# Patient Record
Sex: MALE | Race: WHITE | NOT HISPANIC OR LATINO | Employment: OTHER | URBAN - METROPOLITAN AREA
[De-identification: names, ages, dates, MRNs, and addresses within clinical notes are randomized per-mention and may not be internally consistent; named-entity substitution may affect disease eponyms.]

---

## 2023-03-09 DIAGNOSIS — I10 PRIMARY HYPERTENSION: Primary | ICD-10-CM

## 2023-03-09 RX ORDER — AMLODIPINE BESYLATE 10 MG/1
10 TABLET ORAL DAILY
Qty: 30 TABLET | Refills: 0 | Status: SHIPPED | OUTPATIENT
Start: 2023-03-09 | End: 2023-03-21 | Stop reason: SDUPTHER

## 2023-03-09 RX ORDER — AMLODIPINE BESYLATE 10 MG/1
1 TABLET ORAL DAILY
COMMUNITY
Start: 2022-06-02 | End: 2023-03-09 | Stop reason: SDUPTHER

## 2023-03-09 RX ORDER — SILDENAFIL 100 MG/1
TABLET, FILM COATED ORAL
COMMUNITY
Start: 2022-07-07 | End: 2023-03-11 | Stop reason: ALTCHOICE

## 2023-03-09 RX ORDER — LOSARTAN POTASSIUM 100 MG/1
1 TABLET ORAL DAILY
COMMUNITY
Start: 2022-06-02 | End: 2023-03-09 | Stop reason: SDUPTHER

## 2023-03-09 RX ORDER — ATORVASTATIN CALCIUM 80 MG/1
1 TABLET, FILM COATED ORAL DAILY
COMMUNITY
Start: 2022-06-02 | End: 2023-06-21 | Stop reason: SDUPTHER

## 2023-03-09 RX ORDER — LOSARTAN POTASSIUM 100 MG/1
100 TABLET ORAL DAILY
Qty: 30 TABLET | Refills: 0 | Status: SHIPPED | OUTPATIENT
Start: 2023-03-09 | End: 2023-03-21 | Stop reason: SDUPTHER

## 2023-03-09 NOTE — PROGRESS NOTES
Patient requested refills of BP meds. Has not been seen since July 2022. 30 days supply sent to his pharmacy and he needs appointment for additional refills.

## 2023-03-11 PROBLEM — I10 HYPERTENSION, ESSENTIAL: Status: ACTIVE | Noted: 2023-03-11

## 2023-03-11 PROBLEM — E78.5 HYPERLIPIDEMIA: Status: ACTIVE | Noted: 2023-03-11

## 2023-03-11 PROBLEM — N52.9 MALE ERECTILE DISORDER: Status: ACTIVE | Noted: 2023-03-11

## 2023-03-21 ENCOUNTER — OFFICE VISIT (OUTPATIENT)
Dept: PRIMARY CARE | Facility: CLINIC | Age: 68
End: 2023-03-21
Payer: COMMERCIAL

## 2023-03-21 VITALS
BODY MASS INDEX: 27.49 KG/M2 | HEIGHT: 70 IN | WEIGHT: 192 LBS | SYSTOLIC BLOOD PRESSURE: 142 MMHG | HEART RATE: 88 BPM | DIASTOLIC BLOOD PRESSURE: 82 MMHG

## 2023-03-21 DIAGNOSIS — I10 HYPERTENSION, ESSENTIAL: Primary | ICD-10-CM

## 2023-03-21 PROCEDURE — 99214 OFFICE O/P EST MOD 30 MIN: CPT | Performed by: STUDENT IN AN ORGANIZED HEALTH CARE EDUCATION/TRAINING PROGRAM

## 2023-03-21 PROCEDURE — 3079F DIAST BP 80-89 MM HG: CPT | Performed by: STUDENT IN AN ORGANIZED HEALTH CARE EDUCATION/TRAINING PROGRAM

## 2023-03-21 PROCEDURE — 1160F RVW MEDS BY RX/DR IN RCRD: CPT | Performed by: STUDENT IN AN ORGANIZED HEALTH CARE EDUCATION/TRAINING PROGRAM

## 2023-03-21 PROCEDURE — 1036F TOBACCO NON-USER: CPT | Performed by: STUDENT IN AN ORGANIZED HEALTH CARE EDUCATION/TRAINING PROGRAM

## 2023-03-21 PROCEDURE — 3077F SYST BP >= 140 MM HG: CPT | Performed by: STUDENT IN AN ORGANIZED HEALTH CARE EDUCATION/TRAINING PROGRAM

## 2023-03-21 PROCEDURE — 1159F MED LIST DOCD IN RCRD: CPT | Performed by: STUDENT IN AN ORGANIZED HEALTH CARE EDUCATION/TRAINING PROGRAM

## 2023-03-21 RX ORDER — LOSARTAN POTASSIUM 100 MG/1
100 TABLET ORAL DAILY
Qty: 90 TABLET | Refills: 1 | Status: SHIPPED | OUTPATIENT
Start: 2023-03-21 | End: 2023-06-21 | Stop reason: SDUPTHER

## 2023-03-21 RX ORDER — AMLODIPINE BESYLATE 10 MG/1
10 TABLET ORAL DAILY
Qty: 90 TABLET | Refills: 0 | Status: SHIPPED | OUTPATIENT
Start: 2023-03-21 | End: 2023-06-21 | Stop reason: SDUPTHER

## 2023-03-21 RX ORDER — HYDROCHLOROTHIAZIDE 12.5 MG/1
12.5 TABLET ORAL DAILY
Qty: 90 TABLET | Refills: 0 | Status: SHIPPED | OUTPATIENT
Start: 2023-03-21 | End: 2023-06-21 | Stop reason: SDUPTHER

## 2023-03-21 ASSESSMENT — PATIENT HEALTH QUESTIONNAIRE - PHQ9
1. LITTLE INTEREST OR PLEASURE IN DOING THINGS: NOT AT ALL
SUM OF ALL RESPONSES TO PHQ9 QUESTIONS 1 AND 2: 0
2. FEELING DOWN, DEPRESSED OR HOPELESS: NOT AT ALL

## 2023-03-21 NOTE — PROGRESS NOTES
"Subjective   Patient ID: Norbert Avalos is a 67 y.o. male who presents for BP check Med Refill.    HPI States he feels and has no new complaints. Blood pressure in office today is 150/100. Patient states he takes home blood pressures from time to time and his readings are very inconsistent with systolic ranging from 120s to 150s and diastolic pressures from 80s to 90. Denies any chest pain, SOB, headaches, visual changes, dizziness or syncope.     Review of Systems  All pertinent positive symptoms are included in the history of present illness.    All other systems have been reviewed and are negative and noncontributory to this patient's current ailments.     Social History     Tobacco Use    Smoking status: Never    Smokeless tobacco: Never   Vaping Use    Vaping status: Not on file   Substance Use Topics    Alcohol use: Not on file      Past Surgical History:   Procedure Laterality Date    OTHER SURGICAL HISTORY  06/02/2022    No history of surgery      No Known Allergies     Current Outpatient Medications   Medication Sig Dispense Refill    amLODIPine (Norvasc) 10 mg tablet Take 1 tablet (10 mg) by mouth once daily. 90 tablet 0    atorvastatin (Lipitor) 80 mg tablet Take 1 tablet (80 mg) by mouth once daily.      hydroCHLOROthiazide (HYDRODiuril) 12.5 mg tablet Take 1 tablet (12.5 mg) by mouth once daily. 90 tablet 0    losartan (Cozaar) 100 mg tablet Take 1 tablet (100 mg) by mouth once daily. 90 tablet 1     No current facility-administered medications for this visit.        Patient Active Problem List   Diagnosis    Hyperlipidemia    Hypertension, essential    Male erectile disorder      Immunization History   Administered Date(s) Administered    Hep B, adult 08/21/2002    Tdap 06/08/2012       Objective   Vitals:    03/21/23 1403   BP: 142/82   Pulse: 88   Weight: 87.1 kg (192 lb)   Height: 1.778 m (5' 10\")       Physical Exam  CONSTITUTIONAL - well nourished, well developed, looks like stated age, in no " acute distress, not ill-appearing, and not tired appearing  SKIN - normal skin color and pigmentation, normal skin turgor without rash, lesions, or nodules visualized  HEAD - no trauma, normocephalic  NECK - supple without rigidity, no neck mass was observed, no thyromegaly or thyroid nodules  CHEST - clear to auscultation, no wheezing, no crackles and no rales, good effort  CARDIAC - regular rate and regular rhythm, no skipped beats, no murmur  ABDOMEN - no organomegaly, soft, nontender, nondistended, normal bowel sounds, no guarding/rebound/rigidity,  EXTREMITIES - no edema, no deformities  NEUROLOGICAL - normal gait, normal balance, normal motor, no ataxia, DTRs equal and symmetrical; alert, oriented and no focal signs  PSYCHIATRIC - alert, pleasant and cordial, age-appropriate    Assessment/Plan     Essential HTN   - Uncontrolled. 150/100 in office. Recheck was improved, but still above goal. Home logs inconsistent with widely fluctuating pressures.  - Currently on amlodipine 10 mg once daily and losartan 100 mg once daily   - Start HCTZ 12.5 mg once daily.   - Advised patient to take home blood pressure readings and keep logs to bring in to next visit. Goal blood pressures 130/80 or less.  - Will continue to monitor blood pressures and adjust medications accordingly.     Follow up in 4 weeks. Overdue for physical as well.

## 2023-06-04 DIAGNOSIS — E78.5 HYPERLIPIDEMIA, UNSPECIFIED: ICD-10-CM

## 2023-06-05 RX ORDER — ATORVASTATIN CALCIUM 80 MG/1
TABLET, FILM COATED ORAL
Qty: 90 TABLET | Refills: 1 | OUTPATIENT
Start: 2023-06-05

## 2023-06-17 DIAGNOSIS — I10 HYPERTENSION, ESSENTIAL: ICD-10-CM

## 2023-06-19 RX ORDER — HYDROCHLOROTHIAZIDE 12.5 MG/1
TABLET ORAL
Qty: 90 TABLET | Refills: 0 | OUTPATIENT
Start: 2023-06-19

## 2023-06-19 RX ORDER — AMLODIPINE BESYLATE 10 MG/1
TABLET ORAL
Qty: 90 TABLET | Refills: 0 | OUTPATIENT
Start: 2023-06-19

## 2023-06-21 ENCOUNTER — LAB (OUTPATIENT)
Dept: LAB | Facility: LAB | Age: 68
End: 2023-06-21
Payer: COMMERCIAL

## 2023-06-21 ENCOUNTER — OFFICE VISIT (OUTPATIENT)
Dept: PRIMARY CARE | Facility: CLINIC | Age: 68
End: 2023-06-21
Payer: COMMERCIAL

## 2023-06-21 VITALS
SYSTOLIC BLOOD PRESSURE: 138 MMHG | HEIGHT: 70 IN | WEIGHT: 189 LBS | BODY MASS INDEX: 27.06 KG/M2 | HEART RATE: 98 BPM | DIASTOLIC BLOOD PRESSURE: 88 MMHG

## 2023-06-21 DIAGNOSIS — Z00.00 MEDICARE ANNUAL WELLNESS VISIT, SUBSEQUENT: Primary | ICD-10-CM

## 2023-06-21 DIAGNOSIS — Z12.5 SCREENING FOR PROSTATE CANCER: ICD-10-CM

## 2023-06-21 DIAGNOSIS — Z12.11 ENCOUNTER FOR SCREENING FOR MALIGNANT NEOPLASM OF COLON: ICD-10-CM

## 2023-06-21 DIAGNOSIS — E78.2 MIXED HYPERLIPIDEMIA: ICD-10-CM

## 2023-06-21 DIAGNOSIS — N52.9 MALE ERECTILE DISORDER: ICD-10-CM

## 2023-06-21 DIAGNOSIS — I10 HYPERTENSION, ESSENTIAL: ICD-10-CM

## 2023-06-21 LAB
ALANINE AMINOTRANSFERASE (SGPT) (U/L) IN SER/PLAS: 28 U/L (ref 10–52)
ALBUMIN (G/DL) IN SER/PLAS: 4.7 G/DL (ref 3.4–5)
ALKALINE PHOSPHATASE (U/L) IN SER/PLAS: 62 U/L (ref 33–136)
ANION GAP IN SER/PLAS: 15 MMOL/L (ref 10–20)
ASPARTATE AMINOTRANSFERASE (SGOT) (U/L) IN SER/PLAS: 24 U/L (ref 9–39)
BILIRUBIN TOTAL (MG/DL) IN SER/PLAS: 0.8 MG/DL (ref 0–1.2)
CALCIUM (MG/DL) IN SER/PLAS: 10.1 MG/DL (ref 8.6–10.3)
CARBON DIOXIDE, TOTAL (MMOL/L) IN SER/PLAS: 24 MMOL/L (ref 21–32)
CHLORIDE (MMOL/L) IN SER/PLAS: 104 MMOL/L (ref 98–107)
CHOLESTEROL (MG/DL) IN SER/PLAS: 214 MG/DL (ref 0–199)
CHOLESTEROL IN HDL (MG/DL) IN SER/PLAS: 46.3 MG/DL
CHOLESTEROL/HDL RATIO: 4.6
CREATININE (MG/DL) IN SER/PLAS: 0.82 MG/DL (ref 0.5–1.3)
ESTIMATED AVERAGE GLUCOSE FOR HBA1C: 117 MG/DL
GFR MALE: >90 ML/MIN/1.73M2
GLUCOSE (MG/DL) IN SER/PLAS: 107 MG/DL (ref 74–99)
HEMOGLOBIN A1C/HEMOGLOBIN TOTAL IN BLOOD: 5.7 %
LDL: 121 MG/DL (ref 0–99)
NON HDL CHOLESTEROL: 168 MG/DL
POTASSIUM (MMOL/L) IN SER/PLAS: 3.9 MMOL/L (ref 3.5–5.3)
PROSTATE SPECIFIC ANTIGEN,SCREEN: 2.88 NG/ML (ref 0–4)
PROTEIN TOTAL: 7.4 G/DL (ref 6.4–8.2)
SODIUM (MMOL/L) IN SER/PLAS: 139 MMOL/L (ref 136–145)
TRIGLYCERIDE (MG/DL) IN SER/PLAS: 232 MG/DL (ref 0–149)
UREA NITROGEN (MG/DL) IN SER/PLAS: 15 MG/DL (ref 6–23)
VLDL: 46 MG/DL (ref 0–40)

## 2023-06-21 PROCEDURE — 80061 LIPID PANEL: CPT

## 2023-06-21 PROCEDURE — 1170F FXNL STATUS ASSESSED: CPT | Performed by: STUDENT IN AN ORGANIZED HEALTH CARE EDUCATION/TRAINING PROGRAM

## 2023-06-21 PROCEDURE — 1160F RVW MEDS BY RX/DR IN RCRD: CPT | Performed by: STUDENT IN AN ORGANIZED HEALTH CARE EDUCATION/TRAINING PROGRAM

## 2023-06-21 PROCEDURE — G0439 PPPS, SUBSEQ VISIT: HCPCS | Performed by: STUDENT IN AN ORGANIZED HEALTH CARE EDUCATION/TRAINING PROGRAM

## 2023-06-21 PROCEDURE — 3008F BODY MASS INDEX DOCD: CPT | Performed by: STUDENT IN AN ORGANIZED HEALTH CARE EDUCATION/TRAINING PROGRAM

## 2023-06-21 PROCEDURE — G0103 PSA SCREENING: HCPCS

## 2023-06-21 PROCEDURE — 80053 COMPREHEN METABOLIC PANEL: CPT

## 2023-06-21 PROCEDURE — 1036F TOBACCO NON-USER: CPT | Performed by: STUDENT IN AN ORGANIZED HEALTH CARE EDUCATION/TRAINING PROGRAM

## 2023-06-21 PROCEDURE — 83036 HEMOGLOBIN GLYCOSYLATED A1C: CPT

## 2023-06-21 PROCEDURE — 3075F SYST BP GE 130 - 139MM HG: CPT | Performed by: STUDENT IN AN ORGANIZED HEALTH CARE EDUCATION/TRAINING PROGRAM

## 2023-06-21 PROCEDURE — 36415 COLL VENOUS BLD VENIPUNCTURE: CPT

## 2023-06-21 PROCEDURE — 99214 OFFICE O/P EST MOD 30 MIN: CPT | Performed by: STUDENT IN AN ORGANIZED HEALTH CARE EDUCATION/TRAINING PROGRAM

## 2023-06-21 PROCEDURE — 1159F MED LIST DOCD IN RCRD: CPT | Performed by: STUDENT IN AN ORGANIZED HEALTH CARE EDUCATION/TRAINING PROGRAM

## 2023-06-21 PROCEDURE — 3079F DIAST BP 80-89 MM HG: CPT | Performed by: STUDENT IN AN ORGANIZED HEALTH CARE EDUCATION/TRAINING PROGRAM

## 2023-06-21 RX ORDER — LOSARTAN POTASSIUM 100 MG/1
100 TABLET ORAL DAILY
Qty: 90 TABLET | Refills: 1 | Status: SHIPPED | OUTPATIENT
Start: 2023-06-21 | End: 2023-12-20

## 2023-06-21 RX ORDER — HYDROCHLOROTHIAZIDE 12.5 MG/1
12.5 TABLET ORAL DAILY
Qty: 90 TABLET | Refills: 1 | Status: SHIPPED | OUTPATIENT
Start: 2023-06-21 | End: 2023-12-19

## 2023-06-21 RX ORDER — ATORVASTATIN CALCIUM 80 MG/1
80 TABLET, FILM COATED ORAL DAILY
Qty: 90 TABLET | Refills: 1 | Status: SHIPPED | OUTPATIENT
Start: 2023-06-21 | End: 2023-12-19

## 2023-06-21 RX ORDER — AMLODIPINE BESYLATE 10 MG/1
10 TABLET ORAL DAILY
Qty: 90 TABLET | Refills: 1 | Status: SHIPPED | OUTPATIENT
Start: 2023-06-21 | End: 2023-12-19

## 2023-06-21 ASSESSMENT — ACTIVITIES OF DAILY LIVING (ADL)
GROCERY_SHOPPING: INDEPENDENT
MANAGING_FINANCES: INDEPENDENT
BATHING: INDEPENDENT
DRESSING: INDEPENDENT
TAKING_MEDICATION: INDEPENDENT
DOING_HOUSEWORK: INDEPENDENT

## 2023-06-21 ASSESSMENT — ENCOUNTER SYMPTOMS
DEPRESSION: 0
OCCASIONAL FEELINGS OF UNSTEADINESS: 0
LOSS OF SENSATION IN FEET: 0

## 2023-06-21 NOTE — PROGRESS NOTES
"Subjective   Reason for Visit: Norbert Avalos is an 68 y.o. male here for a Medicare Wellness visit.     Past Medical, Surgical, and Family History reviewed and updated in chart.    Reviewed all medications by prescribing practitioner or clinical pharmacist (such as prescriptions, OTCs, herbal therapies and supplements) and documented in the medical record.    HPI  Patient states he is tolerating all of his medications for blood pressure and cholesterol control.  He reports no chest pain, palpitations, headaches, vision changes, weakness, numbness, tingling, lower extremity edema.    He states that the day after he eats poorly or drinks alcohol he notices that his blood pressure is higher, so he is wondering if he should take more medication those days.  He does admit to drinking 2 bottles of wine about 3 times a week.  He states he is preset his ways and he does not really want to change any of that at this point.  Patient Self Assessment of Health Status  Patient Self Assessment: Good    Nutrition and Exercise  Current Diet: Well Balanced Diet  Adequate Fluid Intake: Yes  Caffeine: Yes  Exercise Frequency: No Exercise    Functional Ability/Level of Safety  Cognitive Impairment Observed: No cognitive impairment observed  Cognitive Impairment Reported: No cognitive impairment reported by patient or family    Home Safety Risk Factors: None    Patient Care Team:  Zhane Quinn DO as PCP - General (Family Medicine)     Review of Systems  All pertinent positive symptoms are included in history of present illness.    All other systems have been reviewed and are negative and noncontributory to this patient's current ailments.  Objective   Vitals:  /90   Pulse 98   Ht 1.778 m (5' 10\")   Wt 85.7 kg (189 lb)   BMI 27.12 kg/m²       Physical Exam  CONSTITUTIONAL - INAD. Not ill appearing.  SKIN - No lesions or rashes visualized. Good skin turgor.  HEAD - Atraumatic, normocephalic..  RESP - CTAB. No wheezing, " rhonchi, or crackles.   CARDIAC - RRR.  Occasional skipped beats.  No murmurs, gallops, or rubs.  ABDOMEN - Soft, nontender, nondistended. No organomegaly.   PSYCH - Appropriate mood and affect.    Assessment/Plan   Diagnoses and all orders for this visit:  Medicare annual wellness visit, subsequent  Declines pneumonia vaccine  Cologuard ordered today for colon cancer screening  Mixed hyperlipidemia  -     Hemoglobin A1C; Future  -     Comprehensive Metabolic Panel; Future  -     Lipid Panel; Future  -     Continue atorvastatin (Lipitor) 80 mg tablet; Take 1 tablet (80 mg) by mouth once daily.  Hypertension, essential  Pretty well controlled on current blood pressure medication regimen.  Consider changing hydrochlorothiazide to spironolactone.  Labs today  -     Hemoglobin A1C; Future  -     Comprehensive Metabolic Panel; Future  -     Lipid Panel; Future  -     amLODIPine (Norvasc) 10 mg tablet; Take 1 tablet (10 mg) by mouth once daily.  -     hydroCHLOROthiazide (HYDRODiuril) 12.5 mg tablet; Take 1 tablet (12.5 mg) by mouth once daily.  -     losartan (Cozaar) 100 mg tablet; Take 1 tablet (100 mg) by mouth once daily.  Screening for prostate cancer  -     Prostate Spec.Ag,Screen; Future    He is to follow-up in 6 months or sooner if he notices his blood pressure is consistently elevated

## 2023-06-22 DIAGNOSIS — E78.2 MIXED HYPERLIPIDEMIA: Primary | ICD-10-CM

## 2023-07-07 LAB — NONINV COLON CA DNA+OCC BLD SCRN STL QL: NEGATIVE

## 2023-08-24 DIAGNOSIS — R93.1 AGATSTON CORONARY ARTERY CALCIUM SCORE BETWEEN 200 AND 399: Primary | ICD-10-CM

## 2023-08-24 DIAGNOSIS — I77.810 DILATATION OF THORACIC AORTA (CMS-HCC): ICD-10-CM

## 2023-10-19 ENCOUNTER — TELEPHONE (OUTPATIENT)
Dept: CARDIOLOGY | Facility: CLINIC | Age: 68
End: 2023-10-19
Payer: COMMERCIAL

## 2023-10-19 NOTE — TELEPHONE ENCOUNTER
LM and went over below info    Please arrive for you stress test on 10/24/23 @ 1pm  Nothing to eat 2 hrs prior  No medications listed to hold  And wear comfortable clothes and shoes as you will be on the treadmill.

## 2023-10-24 ENCOUNTER — APPOINTMENT (OUTPATIENT)
Dept: CARDIOLOGY | Facility: CLINIC | Age: 68
End: 2023-10-24
Payer: COMMERCIAL

## 2023-11-06 ENCOUNTER — TELEPHONE (OUTPATIENT)
Dept: CARDIOLOGY | Facility: CLINIC | Age: 68
End: 2023-11-06
Payer: COMMERCIAL

## 2023-11-21 ENCOUNTER — TELEPHONE (OUTPATIENT)
Dept: CARDIOLOGY | Facility: CLINIC | Age: 68
End: 2023-11-21
Payer: COMMERCIAL

## 2023-11-28 ENCOUNTER — APPOINTMENT (OUTPATIENT)
Dept: CARDIOLOGY | Facility: CLINIC | Age: 68
End: 2023-11-28
Payer: COMMERCIAL

## 2023-12-11 ENCOUNTER — TELEPHONE (OUTPATIENT)
Dept: CARDIOLOGY | Facility: CLINIC | Age: 68
End: 2023-12-11
Payer: COMMERCIAL

## 2023-12-11 NOTE — TELEPHONE ENCOUNTER
LM and went over below info     Please arrive for you stress test on 12/13/23 @ 7:30am  Nothing to eat 2 hrs prior  No medications listed to hold  And wear comfortable clothes and shoes as you will be on the treadmill.

## 2023-12-13 ENCOUNTER — HOSPITAL ENCOUNTER (OUTPATIENT)
Dept: CARDIOLOGY | Facility: CLINIC | Age: 68
Discharge: HOME | End: 2023-12-13
Payer: COMMERCIAL

## 2023-12-13 ENCOUNTER — OFFICE VISIT (OUTPATIENT)
Dept: CARDIOLOGY | Facility: CLINIC | Age: 68
End: 2023-12-13
Payer: COMMERCIAL

## 2023-12-13 VITALS
SYSTOLIC BLOOD PRESSURE: 159 MMHG | WEIGHT: 189.6 LBS | BODY MASS INDEX: 27.14 KG/M2 | HEIGHT: 70 IN | TEMPERATURE: 98 F | DIASTOLIC BLOOD PRESSURE: 99 MMHG | HEART RATE: 81 BPM

## 2023-12-13 VITALS
TEMPERATURE: 98 F | SYSTOLIC BLOOD PRESSURE: 159 MMHG | WEIGHT: 189.6 LBS | HEIGHT: 70 IN | BODY MASS INDEX: 27.14 KG/M2 | DIASTOLIC BLOOD PRESSURE: 99 MMHG | HEART RATE: 81 BPM

## 2023-12-13 DIAGNOSIS — R93.1 AGATSTON CORONARY ARTERY CALCIUM SCORE BETWEEN 200 AND 399: ICD-10-CM

## 2023-12-13 DIAGNOSIS — I25.10 CORONARY ARTERY DISEASE INVOLVING NATIVE CORONARY ARTERY OF NATIVE HEART WITHOUT ANGINA PECTORIS: ICD-10-CM

## 2023-12-13 DIAGNOSIS — E78.2 MIXED HYPERLIPIDEMIA: Primary | ICD-10-CM

## 2023-12-13 DIAGNOSIS — I10 HYPERTENSION, ESSENTIAL: ICD-10-CM

## 2023-12-13 DIAGNOSIS — I71.21 ANEURYSM OF ASCENDING AORTA WITHOUT RUPTURE (CMS-HCC): ICD-10-CM

## 2023-12-13 DIAGNOSIS — I25.10 ATHEROSCLEROSIS OF NATIVE CORONARY ARTERY OF NATIVE HEART, UNSPECIFIED WHETHER ANGINA PRESENT: ICD-10-CM

## 2023-12-13 DIAGNOSIS — I71.21 ANEURYSM OF THE ASCENDING AORTA, WITHOUT RUPTURE (CMS-HCC): ICD-10-CM

## 2023-12-13 LAB
ALBUMIN SERPL BCP-MCNC: 4.8 G/DL (ref 3.4–5)
ALP SERPL-CCNC: 61 U/L (ref 33–136)
ALT SERPL W P-5'-P-CCNC: 25 U/L (ref 10–52)
AORTIC VALVE PEAK VELOCITY: 1.74
AST SERPL W P-5'-P-CCNC: 20 U/L (ref 9–39)
AV PEAK GRADIENT: 12.1
AVA (PEAK VEL): 2.55
BILIRUB DIRECT SERPL-MCNC: 0.1 MG/DL (ref 0–0.3)
BILIRUB SERPL-MCNC: 0.7 MG/DL (ref 0–1.2)
CHOLEST SERPL-MCNC: 231 MG/DL (ref 0–199)
CHOLESTEROL/HDL RATIO: 5.7
EJECTION FRACTION APICAL 4 CHAMBER: 72.8
EJECTION FRACTION: 73
HDLC SERPL-MCNC: 40.4 MG/DL
LDLC SERPL CALC-MCNC: 143 MG/DL
LEFT ATRIUM VOLUME AREA LENGTH INDEX BSA: 37.5
LEFT VENTRICLE INTERNAL DIMENSION DIASTOLE: 4.31 (ref 3.5–6)
LEFT VENTRICULAR OUTFLOW TRACT DIAMETER: 1.87
MITRAL VALVE E/A RATIO: 1.05
MITRAL VALVE E/E' RATIO: 10.88
NON HDL CHOLESTEROL: 191 MG/DL (ref 0–149)
PROT SERPL-MCNC: 7.8 G/DL (ref 6.4–8.2)
RIGHT VENTRICLE FREE WALL PEAK S': 11
RIGHT VENTRICLE PEAK SYSTOLIC PRESSURE: 30.7
TRICUSPID ANNULAR PLANE SYSTOLIC EXCURSION: 1.9
TRIGL SERPL-MCNC: 236 MG/DL (ref 0–149)
VLDL: 47 MG/DL (ref 0–40)

## 2023-12-13 PROCEDURE — 3077F SYST BP >= 140 MM HG: CPT | Performed by: INTERNAL MEDICINE

## 2023-12-13 PROCEDURE — 36415 COLL VENOUS BLD VENIPUNCTURE: CPT | Performed by: INTERNAL MEDICINE

## 2023-12-13 PROCEDURE — 93016 CV STRESS TEST SUPVJ ONLY: CPT | Performed by: INTERNAL MEDICINE

## 2023-12-13 PROCEDURE — 1036F TOBACCO NON-USER: CPT | Performed by: INTERNAL MEDICINE

## 2023-12-13 PROCEDURE — 99214 OFFICE O/P EST MOD 30 MIN: CPT | Performed by: INTERNAL MEDICINE

## 2023-12-13 PROCEDURE — 93018 CV STRESS TEST I&R ONLY: CPT | Performed by: INTERNAL MEDICINE

## 2023-12-13 PROCEDURE — 93306 TTE W/DOPPLER COMPLETE: CPT

## 2023-12-13 PROCEDURE — 93306 TTE W/DOPPLER COMPLETE: CPT | Performed by: INTERNAL MEDICINE

## 2023-12-13 PROCEDURE — 99204 OFFICE O/P NEW MOD 45 MIN: CPT | Performed by: INTERNAL MEDICINE

## 2023-12-13 PROCEDURE — 93017 CV STRESS TEST TRACING ONLY: CPT

## 2023-12-13 PROCEDURE — 3008F BODY MASS INDEX DOCD: CPT | Performed by: INTERNAL MEDICINE

## 2023-12-13 PROCEDURE — 1160F RVW MEDS BY RX/DR IN RCRD: CPT | Performed by: INTERNAL MEDICINE

## 2023-12-13 PROCEDURE — 3080F DIAST BP >= 90 MM HG: CPT | Performed by: INTERNAL MEDICINE

## 2023-12-13 PROCEDURE — 1159F MED LIST DOCD IN RCRD: CPT | Performed by: INTERNAL MEDICINE

## 2023-12-13 PROCEDURE — 80076 HEPATIC FUNCTION PANEL: CPT | Performed by: INTERNAL MEDICINE

## 2023-12-13 PROCEDURE — 80061 LIPID PANEL: CPT | Performed by: INTERNAL MEDICINE

## 2023-12-13 NOTE — PROGRESS NOTES
"Chief Complaint:   Coronary Artery Disease     History of Present Illness     Norbert Avalos is a 68 y.o. male presenting with for evaluation of preclinical coronary artery disease detected by coronary artery calcium scoring.   The patient is tolerating guideline-directed medical therapy with statin medication and is compliant.  The patient exercises regularly and follows a heart healthy diet.  The patient has been well since their last office appointment and is not having any anginal symptoms or dyspnea on exertion.      Review of Systems  All pertinent systems have been reviewed and are negative except for what is stated in the history of present illness.    All other systems have been reviewed and are negative and noncontributory to this patient's current ailments.   .       Previous History     Past Medical History:  He has a past medical history of Agatston coronary artery calcium score between 200 and 399 (12/13/2023), Aneurysm of ascending aorta without rupture (CMS/HCC) (12/13/2023), and Coronary artery disease involving native coronary artery of native heart without angina pectoris (12/13/2023).    Past Surgical History:  He has a past surgical history that includes Other surgical history (06/02/2022).      Social History:  He reports that he has never smoked. He has never used smokeless tobacco. No history on file for alcohol use and drug use.    Family History:  No family history on file.     Allergies:  Patient has no known allergies.    Outpatient Medications:  Current Outpatient Medications   Medication Instructions    amLODIPine (NORVASC) 10 mg, oral, Daily    atorvastatin (LIPITOR) 80 mg, oral, Daily    hydroCHLOROthiazide (HYDRODIURIL) 12.5 mg, oral, Daily    losartan (COZAAR) 100 mg, oral, Daily       Physical Examination   Vitals:  Visit Vitals  BP (!) 159/99 (BP Location: Right arm)   Pulse 81   Temp 36.7 °C (98 °F)   Ht 1.778 m (5' 10\")   Wt 86 kg (189 lb 9.6 oz)   BMI 27.20 kg/m²   Smoking " "Status Never   BSA 2.06 m²      Physical Exam  Vitals reviewed.   Constitutional:       General: He is not in acute distress.     Appearance: Normal appearance.   HENT:      Head: Normocephalic and atraumatic.      Nose: Nose normal.   Eyes:      Conjunctiva/sclera: Conjunctivae normal.   Cardiovascular:      Rate and Rhythm: Normal rate and regular rhythm.      Pulses: Normal pulses.      Heart sounds: No murmur heard.  Pulmonary:      Effort: Pulmonary effort is normal. No respiratory distress.      Breath sounds: Normal breath sounds. No wheezing, rhonchi or rales.   Abdominal:      General: Bowel sounds are normal. There is no distension.      Palpations: Abdomen is soft.      Tenderness: There is no abdominal tenderness.   Musculoskeletal:         General: No swelling.      Right lower leg: No edema.      Left lower leg: No edema.   Skin:     General: Skin is warm and dry.      Capillary Refill: Capillary refill takes less than 2 seconds.   Neurological:      General: No focal deficit present.      Mental Status: He is alert.   Psychiatric:         Mood and Affect: Mood normal.          Labs/Imaging/Cardiac Studies     Last Labs:  CBC -  No results found for: \"WBC\", \"HGB\", \"HCT\", \"MCV\", \"PLT\"    CMP -  Lab Results   Component Value Date    CALCIUM 10.1 06/21/2023    PROT 7.4 06/21/2023    ALBUMIN 4.7 06/21/2023    AST 24 06/21/2023    ALT 28 06/21/2023    ALKPHOS 62 06/21/2023    BILITOT 0.8 06/21/2023       LIPID PANEL -   Lab Results   Component Value Date    CHOL 214 (H) 06/21/2023    HDL 46.3 06/21/2023    CHHDL 4.6 06/21/2023    VLDL 46 (H) 06/21/2023    TRIG 232 (H) 06/21/2023    NHDL 168 06/21/2023       RENAL FUNCTION PANEL -   Lab Results   Component Value Date    K 3.9 06/21/2023       Lab Results   Component Value Date    HGBA1C 5.7 (A) 06/21/2023    HGBA1C 5.3 04/26/2022       ECG:    Echo: Normal  Stress test: Normal  No echocardiogram results found for the past 12 months   Interpreted By:  " JOHANA SYED MD  MRN: [id number]  Patient Name: DAMARIS JOSE     STUDY:  CT CARDIAC SCORING;  8/24/2023 12:24 pm     INDICATION:  hyperlipidemia.     COMPARISON:  None.     ACCESSION NUMBER(S):  [id number]     ORDERING CLINICIAN:  ANASTACIA CHACON     TECHNIQUE:  Using prospective ECG gating, CT scan of the coronary arteries was  performed without intravenous contrast. Coronary calcium scoring  was  performed according to the method of Agatston.     FINDINGS:  The score and distribution of calcium in the coronary arteries is as  follows:     LM 0,  .43,  LCx 0,  RCA 0,     Total 349.43     The visualized ascending thoracic aorta dilated and measures 3.9 cm  in diameter. The heart is normal in size. No pericardial effusion is  present.     No gross evidence of mediastinal or hilar lymphadenopathy or masses  is identified. The visualized segments of the lungs are normally  expanded.     The main pulmonary artery, right and left pulmonary artery are normal  in size.     The visualized subdiaphragmatic structures appear intact.     IMPRESSION:  1. Coronary artery calcium score of 349.43*.    Assessment and Recommendations     Assessment/Plan   1. Mixed hyperlipidemia  His LDL is 121 despite maximal dose statin and diet.  Advise we add Repatha.    2. Hypertension, essential  Hypertension: The patient's blood pressure has been well-controlled at today's appointment or by recent primary care provider's measurements/home measurements and meets their goal blood pressure per the ACC/AHA guidelines.  The patient has been compliant with their anti-hypertensive medications and is following a low sodium/DASH diet and performs regular exercise.  I advised continuation of their present medical treatment and lifestyle modification.  Home checks - goal /80.    3. Coronary artery disease involving native coronary artery of native heart without angina pectoris  CAD: The patient's CAD, as detailed in the HPI, has  been clinically stable, without any anginal symptoms or dyspnea.  The patient will continue treatment with guideline-directed medical therapy with antiplatelet and statin medications and will continue regular exercise and a heart healthy diet.     Normal stress test and Echo.    4. Agatston coronary artery calcium score between 200 and 399  As above    5. Aneurysm of ascending aorta without rupture (CMS/HCC)  Mildly enlarged ascending aorta. Stable and asymptomatic ascending aortic aneurysm.  There is no indication for surgical repair. The patient is tolerating their anti-hypertensive medications including beta blocker and/or ACE/ARB when appropriate to limit expansion and is achieving a goal blood pressure of less than 120/80.  The patient will be schedule for annual surveillance imaging.         Crow Fonseca MD    Exclusive of any other services or procedures performed, I, Crow Fonseca MD , spent 30 minutes in duration for this visit today.  This time consisted of chart review, obtaining history, and/or performing the exam as documented above as well as documenting the clinical information for the encounter in the electronic record, discussing treatment options, plans, and/or goals with patient, family, and/or caregiver, refilling medications, updating the electronic record, ordering medicines, lab work, imaging, referrals, and/or procedures as documented above and communicating with other [facility] professionals. I have discussed the results of laboratory, radiology, and cardiology studies with the patient and their family/caregiver.

## 2023-12-14 ENCOUNTER — TELEPHONE (OUTPATIENT)
Dept: CARDIOLOGY | Facility: CLINIC | Age: 68
End: 2023-12-14
Payer: COMMERCIAL

## 2023-12-14 NOTE — TELEPHONE ENCOUNTER
LDL is still very high despite maximal dose Lipitor. Add Repatha. Dx CAD. Familial hypercholesterolemia

## 2023-12-17 DIAGNOSIS — E78.2 MIXED HYPERLIPIDEMIA: ICD-10-CM

## 2023-12-18 DIAGNOSIS — I10 HYPERTENSION, ESSENTIAL: ICD-10-CM

## 2023-12-19 DIAGNOSIS — I10 HYPERTENSION, ESSENTIAL: ICD-10-CM

## 2023-12-19 RX ORDER — HYDROCHLOROTHIAZIDE 12.5 MG/1
12.5 TABLET ORAL DAILY
Qty: 90 TABLET | Refills: 0 | Status: SHIPPED | OUTPATIENT
Start: 2023-12-19 | End: 2024-01-10 | Stop reason: SDUPTHER

## 2023-12-19 RX ORDER — ATORVASTATIN CALCIUM 80 MG/1
80 TABLET, FILM COATED ORAL DAILY
Qty: 90 TABLET | Refills: 0 | Status: SHIPPED | OUTPATIENT
Start: 2023-12-19 | End: 2024-03-14

## 2023-12-19 RX ORDER — AMLODIPINE BESYLATE 10 MG/1
10 TABLET ORAL DAILY
Qty: 90 TABLET | Refills: 0 | Status: SHIPPED | OUTPATIENT
Start: 2023-12-19 | End: 2024-03-14 | Stop reason: SDUPTHER

## 2023-12-20 RX ORDER — LOSARTAN POTASSIUM 100 MG/1
100 TABLET ORAL DAILY
Qty: 90 TABLET | Refills: 1 | Status: SHIPPED | OUTPATIENT
Start: 2023-12-20 | End: 2024-03-14 | Stop reason: SDUPTHER

## 2023-12-27 DIAGNOSIS — E78.00 PURE HYPERCHOLESTEROLEMIA: Primary | ICD-10-CM

## 2023-12-27 RX ORDER — ALIROCUMAB 150 MG/ML
150 INJECTION, SOLUTION SUBCUTANEOUS
Qty: 6 PEN | Refills: 3 | Status: CANCELLED | OUTPATIENT
Start: 2023-12-27

## 2023-12-27 RX ORDER — ALIROCUMAB 150 MG/ML
150 INJECTION, SOLUTION SUBCUTANEOUS
COMMUNITY
End: 2023-12-27 | Stop reason: SDUPTHER

## 2023-12-28 RX ORDER — ALIROCUMAB 150 MG/ML
150 INJECTION, SOLUTION SUBCUTANEOUS
Qty: 6 PEN | Refills: 3 | Status: SHIPPED | OUTPATIENT
Start: 2023-12-28 | End: 2024-03-25 | Stop reason: SDUPTHER

## 2024-01-04 ASSESSMENT — ENCOUNTER SYMPTOMS
BLURRED VISION: 0
NECK PAIN: 0
PALPITATIONS: 0
SWEATS: 0
PND: 0
SHORTNESS OF BREATH: 0
ORTHOPNEA: 0
HEADACHES: 0
HYPERTENSION: 1

## 2024-01-09 NOTE — PROGRESS NOTES
"Norbert Avalos is a 68 y.o. year old male presenting for Hypertension       HPI:  Patient presenting today for medication refills of his antihypertensive medications including 10 mg, hydrochlorothiazide 12.5 mg and losartan 100 mg.  He notes that his average blood pressure outside of the office and even in the office is in the 140 systolic and is wondering if his medications need to change at this time.  He has been feeling well without any cardiovascular symptoms including chest pain, shortness of breath.    Also notes he did see the cardiologist who put him on an injection for his elevated cholesterol which he just started.  He is wondering if he should get labs again sometime to check his cholesterol levels with this new medication.  ROS:   All pertinent positive symptoms are included in history of present illness.    All other systems have been reviewed and are negative and noncontributory to this patient's current ailments.    Current Outpatient Medications   Medication Sig Dispense Refill    alirocumab (Praluent Pen) 150 mg/mL pen injector Inject 1 mL (150 mg) under the skin every 14 (fourteen) days. 6 Pen 3    amLODIPine (Norvasc) 10 mg tablet TAKE 1 TABLET BY MOUTH EVERY DAY 90 tablet 0    atorvastatin (Lipitor) 80 mg tablet TAKE 1 TABLET BY MOUTH ONCE DAILY. 90 tablet 0    losartan (Cozaar) 100 mg tablet TAKE 1 TABLET BY MOUTH EVERY DAY 90 tablet 1    hydroCHLOROthiazide (HYDRODiuril) 25 mg tablet Take 1 tablet (25 mg) by mouth once daily. 90 tablet 0     No current facility-administered medications for this visit.       OBJECTIVE  Visit Vitals  /82   Pulse 80   Ht 1.778 m (5' 10\")   Wt 85.7 kg (189 lb)   BMI 27.12 kg/m²   Smoking Status Never   BSA 2.06 m²        Physical Exam:  GENERAL: Alert, oriented, pleasant, in no acute distress  HEENT: Head normocephalic, atraumatic  CV: Heart with regular rate and rhythm, normal S1/S2, no murmurs; normal peripheral pulses- radial and dorsalis pedis " palpable  LUNGS: CTAB without wheezing, rhonchi or rales; good respiratory effort, no increased work of breathing  ABDOMEN: soft, non-tender, non-distended, no masses appreciated; +BS in all four quadrants  EXTREMITIES: no edema, no cyanosis  PSYCH: Appropriate mood and affect  SKIN: No rashes or lesions appreciated    Assessment/Plan   Diagnoses and all orders for this visit:  Hypertension, essential  Average seems to be above goal, so I increased his hydrochlorothiazide today to 25 mg daily.  -     hydroCHLOROthiazide (HYDRODiuril) 25 mg tablet; Take 1 tablet (25 mg) by mouth once daily.  Mixed hyperlipidemia  He will need cholesterol rechecked once he is on the Praluent for at least 3 months.  Continue following with cardiology for refills  Aneurysm of ascending aorta without rupture (CMS/HCC)  Stable, follows with cardiology for monitoring  Encounter for immunization  -     Pneumococcal conjugate vaccine, 20-valent (PREVNAR 20)    Please follow-up in 6 months

## 2024-01-10 ENCOUNTER — OFFICE VISIT (OUTPATIENT)
Dept: PRIMARY CARE | Facility: CLINIC | Age: 69
End: 2024-01-10
Payer: COMMERCIAL

## 2024-01-10 VITALS
DIASTOLIC BLOOD PRESSURE: 82 MMHG | WEIGHT: 189 LBS | SYSTOLIC BLOOD PRESSURE: 140 MMHG | HEART RATE: 80 BPM | HEIGHT: 70 IN | BODY MASS INDEX: 27.06 KG/M2

## 2024-01-10 DIAGNOSIS — E78.2 MIXED HYPERLIPIDEMIA: ICD-10-CM

## 2024-01-10 DIAGNOSIS — Z23 ENCOUNTER FOR IMMUNIZATION: ICD-10-CM

## 2024-01-10 DIAGNOSIS — I10 HYPERTENSION, ESSENTIAL: Primary | ICD-10-CM

## 2024-01-10 DIAGNOSIS — I71.21 ANEURYSM OF ASCENDING AORTA WITHOUT RUPTURE (CMS-HCC): ICD-10-CM

## 2024-01-10 PROCEDURE — 90677 PCV20 VACCINE IM: CPT | Performed by: STUDENT IN AN ORGANIZED HEALTH CARE EDUCATION/TRAINING PROGRAM

## 2024-01-10 PROCEDURE — 1159F MED LIST DOCD IN RCRD: CPT | Performed by: STUDENT IN AN ORGANIZED HEALTH CARE EDUCATION/TRAINING PROGRAM

## 2024-01-10 PROCEDURE — G0009 ADMIN PNEUMOCOCCAL VACCINE: HCPCS | Performed by: STUDENT IN AN ORGANIZED HEALTH CARE EDUCATION/TRAINING PROGRAM

## 2024-01-10 PROCEDURE — 1160F RVW MEDS BY RX/DR IN RCRD: CPT | Performed by: STUDENT IN AN ORGANIZED HEALTH CARE EDUCATION/TRAINING PROGRAM

## 2024-01-10 PROCEDURE — 3079F DIAST BP 80-89 MM HG: CPT | Performed by: STUDENT IN AN ORGANIZED HEALTH CARE EDUCATION/TRAINING PROGRAM

## 2024-01-10 PROCEDURE — 3008F BODY MASS INDEX DOCD: CPT | Performed by: STUDENT IN AN ORGANIZED HEALTH CARE EDUCATION/TRAINING PROGRAM

## 2024-01-10 PROCEDURE — 99214 OFFICE O/P EST MOD 30 MIN: CPT | Performed by: STUDENT IN AN ORGANIZED HEALTH CARE EDUCATION/TRAINING PROGRAM

## 2024-01-10 PROCEDURE — 3077F SYST BP >= 140 MM HG: CPT | Performed by: STUDENT IN AN ORGANIZED HEALTH CARE EDUCATION/TRAINING PROGRAM

## 2024-01-10 PROCEDURE — 1036F TOBACCO NON-USER: CPT | Performed by: STUDENT IN AN ORGANIZED HEALTH CARE EDUCATION/TRAINING PROGRAM

## 2024-01-10 RX ORDER — HYDROCHLOROTHIAZIDE 25 MG/1
25 TABLET ORAL DAILY
Qty: 90 TABLET | Refills: 0 | Status: SHIPPED | OUTPATIENT
Start: 2024-01-10 | End: 2024-05-09 | Stop reason: SDUPTHER

## 2024-01-10 ASSESSMENT — PATIENT HEALTH QUESTIONNAIRE - PHQ9
2. FEELING DOWN, DEPRESSED OR HOPELESS: NOT AT ALL
1. LITTLE INTEREST OR PLEASURE IN DOING THINGS: NOT AT ALL
SUM OF ALL RESPONSES TO PHQ9 QUESTIONS 1 AND 2: 0

## 2024-03-14 DIAGNOSIS — E78.2 MIXED HYPERLIPIDEMIA: ICD-10-CM

## 2024-03-14 DIAGNOSIS — I10 HYPERTENSION, ESSENTIAL: Primary | ICD-10-CM

## 2024-03-14 RX ORDER — AMLODIPINE BESYLATE 10 MG/1
10 TABLET ORAL DAILY
Qty: 90 TABLET | Refills: 0 | Status: SHIPPED | OUTPATIENT
Start: 2024-03-14

## 2024-03-14 RX ORDER — LOSARTAN POTASSIUM 100 MG/1
100 TABLET ORAL DAILY
Qty: 90 TABLET | Refills: 0 | Status: SHIPPED | OUTPATIENT
Start: 2024-03-14 | End: 2024-06-10

## 2024-03-14 RX ORDER — ATORVASTATIN CALCIUM 80 MG/1
80 TABLET, FILM COATED ORAL DAILY
Qty: 90 TABLET | Refills: 0 | Status: SHIPPED | OUTPATIENT
Start: 2024-03-14

## 2024-03-20 ENCOUNTER — TELEPHONE (OUTPATIENT)
Dept: CARDIOLOGY | Facility: CLINIC | Age: 69
End: 2024-03-20
Payer: COMMERCIAL

## 2024-03-20 DIAGNOSIS — E78.2 MIXED HYPERLIPIDEMIA: Primary | ICD-10-CM

## 2024-03-20 NOTE — TELEPHONE ENCOUNTER
Pt lm he just finished his first three months of praulent. Do you want labs? Lipids/lfts? Please advise.

## 2024-03-21 ENCOUNTER — LAB (OUTPATIENT)
Dept: LAB | Facility: LAB | Age: 69
End: 2024-03-21
Payer: COMMERCIAL

## 2024-03-21 DIAGNOSIS — E78.2 MIXED HYPERLIPIDEMIA: ICD-10-CM

## 2024-03-21 PROCEDURE — 80061 LIPID PANEL: CPT

## 2024-03-21 PROCEDURE — 36415 COLL VENOUS BLD VENIPUNCTURE: CPT

## 2024-03-21 PROCEDURE — 80076 HEPATIC FUNCTION PANEL: CPT

## 2024-03-22 LAB
ALBUMIN SERPL BCP-MCNC: 4.5 G/DL (ref 3.4–5)
ALP SERPL-CCNC: 57 U/L (ref 33–136)
ALT SERPL W P-5'-P-CCNC: 25 U/L (ref 10–52)
AST SERPL W P-5'-P-CCNC: 19 U/L (ref 9–39)
BILIRUB DIRECT SERPL-MCNC: 0.2 MG/DL (ref 0–0.3)
BILIRUB SERPL-MCNC: 0.7 MG/DL (ref 0–1.2)
CHOLEST SERPL-MCNC: 90 MG/DL (ref 0–199)
CHOLESTEROL/HDL RATIO: 2.2
HDLC SERPL-MCNC: 41.3 MG/DL
LDLC SERPL CALC-MCNC: 19 MG/DL
NON HDL CHOLESTEROL: 49 MG/DL (ref 0–149)
PROT SERPL-MCNC: 7.3 G/DL (ref 6.4–8.2)
TRIGL SERPL-MCNC: 148 MG/DL (ref 0–149)
VLDL: 30 MG/DL (ref 0–40)

## 2024-03-25 ENCOUNTER — PATIENT MESSAGE (OUTPATIENT)
Dept: CARDIOLOGY | Facility: CLINIC | Age: 69
End: 2024-03-25
Payer: COMMERCIAL

## 2024-03-25 DIAGNOSIS — E78.00 PURE HYPERCHOLESTEROLEMIA: ICD-10-CM

## 2024-03-25 RX ORDER — ALIROCUMAB 150 MG/ML
150 INJECTION, SOLUTION SUBCUTANEOUS
Qty: 6 PEN | Refills: 3 | Status: SHIPPED | OUTPATIENT
Start: 2024-03-25 | End: 2024-06-10 | Stop reason: SDUPTHER

## 2024-05-09 ENCOUNTER — OFFICE VISIT (OUTPATIENT)
Dept: PRIMARY CARE | Facility: CLINIC | Age: 69
End: 2024-05-09
Payer: COMMERCIAL

## 2024-05-09 VITALS
OXYGEN SATURATION: 96 % | BODY MASS INDEX: 27.12 KG/M2 | HEART RATE: 105 BPM | DIASTOLIC BLOOD PRESSURE: 80 MMHG | WEIGHT: 189 LBS | SYSTOLIC BLOOD PRESSURE: 136 MMHG

## 2024-05-09 DIAGNOSIS — N52.9 MALE ERECTILE DISORDER: ICD-10-CM

## 2024-05-09 DIAGNOSIS — J01.90 ACUTE BACTERIAL SINUSITIS: ICD-10-CM

## 2024-05-09 DIAGNOSIS — Z00.00 ENCOUNTER FOR ANNUAL WELLNESS EXAM IN MEDICARE PATIENT: Primary | ICD-10-CM

## 2024-05-09 DIAGNOSIS — I71.21 ANEURYSM OF ASCENDING AORTA WITHOUT RUPTURE (CMS-HCC): ICD-10-CM

## 2024-05-09 DIAGNOSIS — I10 HYPERTENSION, ESSENTIAL: ICD-10-CM

## 2024-05-09 DIAGNOSIS — B96.89 ACUTE BACTERIAL SINUSITIS: ICD-10-CM

## 2024-05-09 PROCEDURE — 1123F ACP DISCUSS/DSCN MKR DOCD: CPT | Performed by: STUDENT IN AN ORGANIZED HEALTH CARE EDUCATION/TRAINING PROGRAM

## 2024-05-09 PROCEDURE — 3008F BODY MASS INDEX DOCD: CPT | Performed by: STUDENT IN AN ORGANIZED HEALTH CARE EDUCATION/TRAINING PROGRAM

## 2024-05-09 PROCEDURE — 1158F ADVNC CARE PLAN TLK DOCD: CPT | Performed by: STUDENT IN AN ORGANIZED HEALTH CARE EDUCATION/TRAINING PROGRAM

## 2024-05-09 PROCEDURE — 1159F MED LIST DOCD IN RCRD: CPT | Performed by: STUDENT IN AN ORGANIZED HEALTH CARE EDUCATION/TRAINING PROGRAM

## 2024-05-09 PROCEDURE — 1170F FXNL STATUS ASSESSED: CPT | Performed by: STUDENT IN AN ORGANIZED HEALTH CARE EDUCATION/TRAINING PROGRAM

## 2024-05-09 PROCEDURE — 1157F ADVNC CARE PLAN IN RCRD: CPT | Performed by: STUDENT IN AN ORGANIZED HEALTH CARE EDUCATION/TRAINING PROGRAM

## 2024-05-09 PROCEDURE — G0439 PPPS, SUBSEQ VISIT: HCPCS | Performed by: STUDENT IN AN ORGANIZED HEALTH CARE EDUCATION/TRAINING PROGRAM

## 2024-05-09 PROCEDURE — 1036F TOBACCO NON-USER: CPT | Performed by: STUDENT IN AN ORGANIZED HEALTH CARE EDUCATION/TRAINING PROGRAM

## 2024-05-09 PROCEDURE — 3075F SYST BP GE 130 - 139MM HG: CPT | Performed by: STUDENT IN AN ORGANIZED HEALTH CARE EDUCATION/TRAINING PROGRAM

## 2024-05-09 PROCEDURE — 1160F RVW MEDS BY RX/DR IN RCRD: CPT | Performed by: STUDENT IN AN ORGANIZED HEALTH CARE EDUCATION/TRAINING PROGRAM

## 2024-05-09 PROCEDURE — 3079F DIAST BP 80-89 MM HG: CPT | Performed by: STUDENT IN AN ORGANIZED HEALTH CARE EDUCATION/TRAINING PROGRAM

## 2024-05-09 PROCEDURE — 99214 OFFICE O/P EST MOD 30 MIN: CPT | Performed by: STUDENT IN AN ORGANIZED HEALTH CARE EDUCATION/TRAINING PROGRAM

## 2024-05-09 RX ORDER — HYDROCHLOROTHIAZIDE 25 MG/1
25 TABLET ORAL DAILY
Qty: 90 TABLET | Refills: 1 | Status: SHIPPED | OUTPATIENT
Start: 2024-05-09 | End: 2024-11-05

## 2024-05-09 RX ORDER — TADALAFIL 10 MG/1
10 TABLET ORAL DAILY PRN
Qty: 12 TABLET | Refills: 3 | Status: SHIPPED | OUTPATIENT
Start: 2024-05-09 | End: 2025-05-09

## 2024-05-09 RX ORDER — AMOXICILLIN AND CLAVULANATE POTASSIUM 875; 125 MG/1; MG/1
875 TABLET, FILM COATED ORAL 2 TIMES DAILY
Qty: 20 TABLET | Refills: 0 | Status: SHIPPED | OUTPATIENT
Start: 2024-05-09 | End: 2024-05-19

## 2024-05-09 ASSESSMENT — ACTIVITIES OF DAILY LIVING (ADL)
DOING_HOUSEWORK: INDEPENDENT
DRESSING: INDEPENDENT
MANAGING_FINANCES: INDEPENDENT
GROCERY_SHOPPING: INDEPENDENT
TAKING_MEDICATION: INDEPENDENT
BATHING: INDEPENDENT

## 2024-05-09 ASSESSMENT — ENCOUNTER SYMPTOMS
DEPRESSION: 0
OCCASIONAL FEELINGS OF UNSTEADINESS: 0
LOSS OF SENSATION IN FEET: 0

## 2024-05-09 NOTE — PROGRESS NOTES
Norbert Avalos is a 68 y.o. year old male presenting for Medicare Annual Wellness Visit Subsequent, URI, and Erectile Dysfunction       HPI: Patient thinks he had a viral cold last month and, since then, has had congestion , vague and mild facial pain predominantly on the left side, and a feeling of pressure in his sinuses.     ROS:   All pertinent positive symptoms are included in history of present illness.    All other systems have been reviewed and are negative and noncontributory to this patient's current ailments.    Current Outpatient Medications   Medication Sig Dispense Refill    alirocumab (Praluent Pen) 150 mg/mL pen injector Inject 1 mL (150 mg) under the skin every 14 (fourteen) days. 6 Pen 3    amLODIPine (Norvasc) 10 mg tablet Take 1 tablet (10 mg) by mouth once daily. 90 tablet 0    atorvastatin (Lipitor) 80 mg tablet TAKE 1 TABLET BY MOUTH EVERY DAY 90 tablet 0    hydroCHLOROthiazide (HYDRODiuril) 25 mg tablet Take 1 tablet (25 mg) by mouth once daily. 90 tablet 0    losartan (Cozaar) 100 mg tablet Take 1 tablet (100 mg) by mouth once daily. 90 tablet 0     No current facility-administered medications for this visit.       OBJECTIVE  Visit Vitals  /80 (BP Location: Left arm, Patient Position: Sitting, BP Cuff Size: Adult)   Pulse 105   Wt 85.7 kg (189 lb)   SpO2 96%   BMI 27.12 kg/m²   Smoking Status Never   BSA 2.06 m²        Physical Exam:  GENERAL: Alert, oriented, pleasant, in no acute distress  HEENT: Head normocephalic, atraumatic  CV: Heart with regular rate and rhythm, normal S1/S2, no murmurs; normal peripheral pulses- radial and dorsalis pedis palpable  LUNGS: CTAB without wheezing, rhonchi or rales; good respiratory effort, no increased work of breathing  ABDOMEN: soft, non-tender, non-distended, no masses appreciated; +BS in all four quadrants  EXTREMITIES: no edema, no cyanosis  PSYCH: Appropriate mood and affect  SKIN: No rashes or lesions appreciated    Assessment/Plan

## 2024-05-09 NOTE — PROGRESS NOTES
Subjective   Reason for Visit: Norbert Avalos is an 68 y.o. male here for a Medicare Wellness visit.     Past Medical, Surgical, and Family History reviewed and updated in chart.    Reviewed all medications by prescribing practitioner or clinical pharmacist (such as prescriptions, OTCs, herbal therapies and supplements) and documented in the medical record.    URI  Patient thinks he had a viral cold last month, the cold-like symptoms resolved but he was left with persistent feeling of pressure in his sinuses, congestion, and tenderness on the left side of his maxilla.  He denies fever, swelling, cough, and has a remote history of allergies with no similar symptoms in recent years.    ED  Patient reports ongoing ED and would like to try pharmaceutical therapy.  He is sexually active with his girlfriend whom he lives with.    HTN  Patient reports home readings of 130s/80s.    Requesting refill of hydrochlorothiazide       Patient Self Assessment of Health Status  Patient Self Assessment: Good    Nutrition and Exercise  Current Diet: Heart Healthy Diet  Adequate Fluid Intake: Yes  Caffeine: Yes  Exercise Frequency: Regularly    Functional Ability/Level of Safety  Cognitive Impairment Observed: No cognitive impairment observed    Home Safety Risk Factors: None    Patient Care Team:  Zhane Quinn DO as PCP - General (Family Medicine)     Review of Systems  All pertinent positive symptoms are included in history of present illness.    All other systems have been reviewed and are negative and noncontributory to this patient's current ailments.    Objective   Vitals:  /80 (BP Location: Left arm, Patient Position: Sitting, BP Cuff Size: Adult)   Pulse 105   Wt 85.7 kg (189 lb)   SpO2 96%   BMI 27.12 kg/m²       Physical Exam  CONSTITUTIONAL - INAD. Not ill appearing.  SKIN - No lesions or rashes visualized. Good skin turgor.  HEAD - Atraumatic, normocephalic..  RESP - CTAB. No wheezing, rhonchi, or  crackles.   CARDIAC - RRR. No murmurs, gallops, or rubs.  ABDOMEN - Soft, nontender, nondistended. No organomegaly.   PSYCH - Appropriate mood and affect.    Assessment/Plan   Annual Medicare wellness  All age appropriate preventive screenings and vaccinations are up-to-date  Ascending aortic aneurysm  Stable, continue following with Dr. Fonseca  HTN  Well-controlled on current medications  Refills provided today of hydrochlorothiazide 25 mg daily  - Continue following with cardiology  Acute sinusitis  Due to the duration of his symptoms, I feel he would benefit from antibiotic therapy at this time.  Augmentin was sent to his pharmacy.  He should call the office if he is not improving over the next week  Erectile dysfunction  Prescription for Cialis sent to his pharmacy    He should follow-up in 6 months or sooner as needed

## 2024-06-10 ENCOUNTER — PATIENT MESSAGE (OUTPATIENT)
Dept: CARDIOLOGY | Facility: CLINIC | Age: 69
End: 2024-06-10
Payer: COMMERCIAL

## 2024-06-10 DIAGNOSIS — E78.00 PURE HYPERCHOLESTEROLEMIA: ICD-10-CM

## 2024-06-10 DIAGNOSIS — I10 HYPERTENSION, ESSENTIAL: ICD-10-CM

## 2024-06-10 RX ORDER — LOSARTAN POTASSIUM 100 MG/1
100 TABLET ORAL DAILY
Qty: 90 TABLET | Refills: 1 | Status: SHIPPED | OUTPATIENT
Start: 2024-06-10

## 2024-06-10 RX ORDER — ALIROCUMAB 150 MG/ML
150 INJECTION, SOLUTION SUBCUTANEOUS
Qty: 6 PEN | Refills: 3 | Status: SHIPPED | OUTPATIENT
Start: 2024-06-10

## 2024-06-10 NOTE — TELEPHONE ENCOUNTER
From: Norbert Avalos  To: Crow Fonseca  Sent: 6/10/2024 9:17 AM EDT  Subject: prescription refill    Dr. Fonseca,    Can you please refill my praluent, and also, do I need blood work done again?  Thanks,  Tonny Avalos

## 2024-06-24 DIAGNOSIS — I10 HYPERTENSION, ESSENTIAL: Primary | ICD-10-CM

## 2024-06-24 RX ORDER — AMLODIPINE BESYLATE 10 MG/1
10 TABLET ORAL DAILY
Qty: 90 TABLET | Refills: 1 | Status: SHIPPED | OUTPATIENT
Start: 2024-06-24

## 2024-08-05 DIAGNOSIS — E78.2 MIXED HYPERLIPIDEMIA: Primary | ICD-10-CM

## 2024-08-05 RX ORDER — ATORVASTATIN CALCIUM 80 MG/1
80 TABLET, FILM COATED ORAL DAILY
Qty: 90 TABLET | Refills: 0 | Status: SHIPPED | OUTPATIENT
Start: 2024-08-05

## 2024-08-12 ENCOUNTER — TELEPHONE (OUTPATIENT)
Dept: CARDIOLOGY | Facility: CLINIC | Age: 69
End: 2024-08-12
Payer: COMMERCIAL

## 2024-09-17 ENCOUNTER — TELEPHONE (OUTPATIENT)
Dept: CARDIOLOGY | Facility: CLINIC | Age: 69
End: 2024-09-17
Payer: COMMERCIAL

## 2024-09-17 DIAGNOSIS — E78.2 MIXED HYPERLIPIDEMIA: Primary | ICD-10-CM

## 2024-10-31 DIAGNOSIS — E78.2 MIXED HYPERLIPIDEMIA: ICD-10-CM

## 2024-10-31 RX ORDER — ATORVASTATIN CALCIUM 80 MG/1
80 TABLET, FILM COATED ORAL DAILY
Qty: 90 TABLET | Refills: 1 | Status: SHIPPED | OUTPATIENT
Start: 2024-10-31

## 2024-11-04 ENCOUNTER — APPOINTMENT (OUTPATIENT)
Dept: PHARMACY | Facility: HOSPITAL | Age: 69
End: 2024-11-04
Payer: COMMERCIAL

## 2024-11-04 DIAGNOSIS — E78.2 MIXED HYPERLIPIDEMIA: ICD-10-CM

## 2024-11-04 NOTE — Clinical Note
Jose Fonseca,  Today I spoke with Norbert about his Praluent and our cost assistance program. Patient reports he is currently only injecting Praluent 150mg once monthly to make it last through December appt with you. He reports tolerating Praluent and atorvastatin with no adverse effects. Plan to submit PAP application once income documentation is received that way he can go back on every 14 day injections as prescribed. Thank you!

## 2024-11-04 NOTE — ASSESSMENT & PLAN NOTE
Patient currently on atorvastatin 80mg and Praluent 150mg injection. Last lipid panel (03/2024), patient below LDL goal, appropriate to continue current regimen.

## 2024-11-04 NOTE — PROGRESS NOTES
Pharmacist Clinic: Cardiology Management    Norbert Avalos is a 69 y.o. male was referred to Clinical Pharmacy Team for cholesterol management.     Referring Provider: Crow Fonseca MD    THIS IS A NEW PATIENT APPOINTMENT. PATIENT WILL BE ESTABLISHING CARE WITH CLINICAL PHARMACY.    Appointment was completed by Norbert who was reached at primary number.    Allergies Reviewed? Yes    No Known Allergies    Past Medical History:   Diagnosis Date    Agatston coronary artery calcium score between 200 and 399 12/13/2023    Aneurysm of ascending aorta without rupture (CMS-HCC) 12/13/2023    3.9 cm    Coronary artery disease involving native coronary artery of native heart without angina pectoris 12/13/2023       Current Outpatient Medications on File Prior to Visit   Medication Sig Dispense Refill    alirocumab (Praluent Pen) 150 mg/mL pen injector Inject 1 mL (150 mg) under the skin every 14 (fourteen) days. 6 Pen 3    amLODIPine (Norvasc) 10 mg tablet Take 1 tablet (10 mg) by mouth once daily. 90 tablet 1    atorvastatin (Lipitor) 80 mg tablet TAKE 1 TABLET BY MOUTH ONCE DAILY. 90 tablet 1    hydroCHLOROthiazide (HYDRODiuril) 25 mg tablet Take 1 tablet (25 mg) by mouth once daily. 90 tablet 1    losartan (Cozaar) 100 mg tablet TAKE 1 TABLET BY MOUTH EVERY DAY 90 tablet 1    tadalafil (Cialis) 10 mg tablet Take 1 tablet (10 mg) by mouth once daily as needed for erectile dysfunction. 12 tablet 3    [DISCONTINUED] atorvastatin (Lipitor) 80 mg tablet Take 1 tablet (80 mg) by mouth once daily. 90 tablet 0     No current facility-administered medications on file prior to visit.         RELEVANT LAB RESULTS:  Lab Results   Component Value Date    BILITOT 0.7 03/21/2024    CALCIUM 10.1 06/21/2023    CO2 24 06/21/2023     06/21/2023    CREATININE 0.82 06/21/2023    GLUCOSE 107 (H) 06/21/2023    ALKPHOS 57 03/21/2024    K 3.9 06/21/2023    PROT 7.3 03/21/2024     06/21/2023    AST 19 03/21/2024    ALT 25 03/21/2024  "   BUN 15 06/21/2023    ANIONGAP 15 06/21/2023    ALBUMIN 4.5 03/21/2024    GFRMALE >90 06/21/2023     Lab Results   Component Value Date    TRIG 148 03/21/2024    CHOL 90 03/21/2024    LDLCALC 19 03/21/2024    HDL 41.3 03/21/2024     No results found for: \"BMCBC\", \"CBCDIF\"     PHARMACEUTICAL ASSESSMENT:    MEDICATION RECONCILIATION    Home Pharmacy Reviewed? Yes, describe: [facility]; [place], [place]    Drug Interactions? No    Medication Documentation Review Audit       Reviewed by Eli GirardD (Pharmacist) on 11/04/24 at 1056      Medication Order Taking? Sig Documenting Provider Last Dose Status   alirocumab (Praluent Pen) 150 mg/mL pen injector [id number] Yes Inject 1 mL (150 mg) under the skin every 14 (fourteen) days. Crow Fonseca MD  Active   amLODIPine (Norvasc) 10 mg tablet [id number] Yes Take 1 tablet (10 mg) by mouth once daily. Zhane Quinn DO  Active     Discontinued 10/31/24 1301   atorvastatin (Lipitor) 80 mg tablet [id number] Yes TAKE 1 TABLET BY MOUTH ONCE DAILY. Zhane Quinn DO  Active   hydroCHLOROthiazide (HYDRODiuril) 25 mg tablet [id number] Yes Take 1 tablet (25 mg) by mouth once daily. hZane Quinn DO  Active   losartan (Cozaar) 100 mg tablet [id number] Yes TAKE 1 TABLET BY MOUTH EVERY DAY Zhane Quinn DO  Active   tadalafil (Cialis) 10 mg tablet [id number] Yes Take 1 tablet (10 mg) by mouth once daily as needed for erectile dysfunction. Zhane Quinn DO  Active                    DISEASE MANAGEMENT ASSESSMENT:     HYPERCHOLESTEROLEMIA ASSESSMENT    RECENT LIPID PANEL (DATE): 03/21/2024  Lab Results   Component Value Date    TRIG 148 03/21/2024    CHOL 90 03/21/2024    LDLCALC 19 03/21/2024    HDL 41.3 03/21/2024          ASCVD SCORE: The ASCVD Risk score (Daisha DK, et al., 2019) failed to calculate for the following reasons:    The valid total cholesterol range is 130 to 320 mg/dL  Coronary Heart Disease (MI, angina, coronary artery stenosis): No   History of " ischemic stroke? No   History of carotid artery stenosis? No   Peripheral artery disease? No   ASCVD RISK FACTORS:    CKD? No   Diabetes? No   HTN? Yes   Persistently elevated LDL? No   Elevated triglycerides? No   Inflammatory diseases (rheumatoid arthritis, psoriasis, HIV)? No    CURRENT PHARMACOTHERAPY  - Statin? Yes, describe: Atorvastatin 80mg daily  - Ezetimibe? No  - PCSK9-I? Yes, describe: Praluent 150mg every 14 days  - Other lipid lowering agents? No      RELEVANT PAST MEDICAL HISTORY:   HLD, HTN    ASSESSMENT    Affordability/Accessibility:  PAP screen  Adherence/Organization: patient reports non-adherence due to cost  Adverse Effects: none   Recent Hospitalizations: No  Diet:   Breakfast: Coffee  Lunch: yogurt, soup, sandwich, frozen meal (I.e lean cuisine)  Dinner: meat carb, veggie/salad  Exercise: Yes, describe: Rides stationary bike 3-4x week for 30 minutes   Tobacco Use: No  Alcohol Use: Yes, describe: Couple glasses of wine couple times week    EDUCATION/COUNSELING:  - Counseled patient on MOA, expectations, side effects, duration of therapy, contraindications, administration, and monitoring parameters  - Answered all patient questions and concerns     DISCUSSION/NOTES:   Today was a initial visit to establish with clinical pharmacy. Patient allergies and medications reviewed and updated.  Patient states Praluent recently increased to $114/month. Patient reports calling Dr. Fonseca and states he is planning to do doing 1 injection/month until his follow up appt in December. Patient last injection was October 12th (Saturday) , he states his next injection is November 9th because he is injecting once a month.  Patient reports tolerating Praluent and atorvastatin with no adverse effects. Patient states he exercises on a stationary bike about 3-4x/week for 30 minutes.   Patient was screened for  PAP. Patient reports changing insurance plans in January because they will not be offering his current  plan in 2025. Explained to patient as long as insurance contributes toward the cost of Praluent, he will continue to be enrolled in our program. Patient verbalized understanding. Plan to submit application once income documentation is received.     ASSESSMENT:   Patient Assistance Program (PAP)    Application for program to be submitted for the following medications: Praluent    County  Permanent Address: [place]   Prescription Insurance:   Yes   Members of Household: 1   Files Taxes: Yes       Patient will be email financial information to pharmacist directly at [email or web address].    Patient verbally reports monthly or yearly income which is less than 400% federal poverty level    Patient aware this process may take up to 6 weeks.     If approved medication must be filled through [facility] and MEDICATION WILL BE MAILED TO PATIENT.         Assessment/Plan   Problem List Items Addressed This Visit       Hyperlipidemia     Patient currently on atorvastatin 80mg and Praluent 150mg injection. Last lipid panel (03/2024), patient below LDL goal, appropriate to continue current regimen.          Relevant Orders    Referral to Clinical Pharmacy         RECOMMENDATIONS/PLAN:    CONTINUE  Praluent 150mg injection every 14 days  Atorvastatin 80mg daily    Last Appnt with Referring Provider: 12/13/2023  Next Appnt with Referring Provider: 12/16/2024  Clinical Pharmacist follow up: 1 month  VAF/Application Expiration: pending  Type of Encounter: Alexa Will, PharmD    Verbal consent to manage patient's drug therapy was obtained from the patient . They were informed they may decline to participate or withdraw from participation in pharmacy services at any time.    Continue all meds under the continuation of care with the referring provider and clinical pharmacy team.

## 2024-11-07 DIAGNOSIS — E78.2 MIXED HYPERLIPIDEMIA: Primary | ICD-10-CM

## 2024-11-07 RX ORDER — ALIROCUMAB 150 MG/ML
1 INJECTION, SOLUTION SUBCUTANEOUS
Qty: 6 ML | Refills: 3 | Status: SHIPPED | OUTPATIENT
Start: 2024-11-07

## 2024-11-08 PROCEDURE — RXMED WILLOW AMBULATORY MEDICATION CHARGE

## 2024-11-13 ENCOUNTER — PHARMACY VISIT (OUTPATIENT)
Dept: PHARMACY | Facility: CLINIC | Age: 69
End: 2024-11-13
Payer: COMMERCIAL

## 2024-11-18 DIAGNOSIS — I10 HYPERTENSION, ESSENTIAL: ICD-10-CM

## 2024-11-18 RX ORDER — HYDROCHLOROTHIAZIDE 25 MG/1
25 TABLET ORAL DAILY
Qty: 90 TABLET | Refills: 0 | Status: SHIPPED | OUTPATIENT
Start: 2024-11-18

## 2024-11-27 ENCOUNTER — TELEPHONE (OUTPATIENT)
Dept: PHARMACY | Facility: HOSPITAL | Age: 69
End: 2024-11-27
Payer: COMMERCIAL

## 2024-11-27 NOTE — TELEPHONE ENCOUNTER
Patient Assistance Program Approval:     We are pleased to inform you that your application for assistance has been approved.     This approval is valid through 11/08/2025 as long as the following criteria continue to be satisfied:     Your medication (Praluent) remains covered under your current insurance plan.   Your prescriber does not discontinue therapy.   You do not seek reimbursement from any other private or government-funded programs for the  medication.    Under this program, the pharmacy will first bill your insurance plan for your indemnified specified medication. The [employer] will then offset your copay balance, so that your out-of pocket expense for your specialty medication will be $0.00.    Casandra Will, PharmD

## 2024-12-08 DIAGNOSIS — I10 HYPERTENSION, ESSENTIAL: ICD-10-CM

## 2024-12-09 ENCOUNTER — APPOINTMENT (OUTPATIENT)
Dept: PHARMACY | Facility: HOSPITAL | Age: 69
End: 2024-12-09
Payer: COMMERCIAL

## 2024-12-09 DIAGNOSIS — E78.2 MIXED HYPERLIPIDEMIA: ICD-10-CM

## 2024-12-09 RX ORDER — LOSARTAN POTASSIUM 100 MG/1
100 TABLET ORAL DAILY
Qty: 30 TABLET | Refills: 0 | Status: SHIPPED | OUTPATIENT
Start: 2024-12-09

## 2024-12-09 NOTE — ASSESSMENT & PLAN NOTE
Patient currently on atorvastatin 80mg and Praluent 150mg injection. Last lipid panel (03/2024), patient below LDL goal, appropriate to continue current regimen.   Patient to complete lipid panel during upcoming cardiology visit.

## 2024-12-09 NOTE — Clinical Note
Jose Fonseca,  Today I spoke with Norbert about his Praluent. Now that he is enrolled in [facility], he reports compliance, injecting Praluent every 2 weeks. He states he is tolerating medication well with no adverse effects. Routine lipid panel has been ordered, plan to follow up in 2 months to assess results. Thank you!

## 2024-12-09 NOTE — PROGRESS NOTES
Pharmacist Clinic: Cardiology Management    Norbert Avalos is a 69 y.o. male was referred to Clinical Pharmacy Team for cholesterol management.     Referring Provider: Crow Fonseca MD    THIS IS A FOLLOW UP PATIENT APPOINTMENT. AT LAST VISIT ON 11/04/2024 WITH PHARMACIST (Casandra Will).    REVIEW OF LAST APPT  Today was a initial visit to establish with clinical pharmacy. Patient allergies and medications reviewed and updated.  Patient states Praluent recently increased to $114/month. Patient reports calling Dr. Fonseca and states he is planning to do doing 1 injection/month until his follow up appt in December. Patient last injection was October 12th (Saturday) , he states his next injection is November 9th because he is injecting once a month.  Patient reports tolerating Praluent and atorvastatin with no adverse effects. Patient states he exercises on a stationary bike about 3-4x/week for 30 minutes.   Patient was screened for  PAP. Patient reports changing insurance plans in January because they will not be offering his current plan in 2025. Explained to patient as long as insurance contributes toward the cost of Praluent, he will continue to be enrolled in our program. Patient verbalized understanding. Plan to submit application once income documentation is received.   Patient was enrolled in  PAP    Appointment was completed by Norbert who was reached at primary number.    Allergies Reviewed? No    No Known Allergies    Past Medical History:   Diagnosis Date    Agatston coronary artery calcium score between 200 and 399 12/13/2023    Aneurysm of ascending aorta without rupture (CMS-HCC) 12/13/2023    3.9 cm    Coronary artery disease involving native coronary artery of native heart without angina pectoris 12/13/2023       Current Outpatient Medications on File Prior to Visit   Medication Sig Dispense Refill    alirocumab (Praluent Pen) 150 mg/mL pen injector Inject 1 mL (150 mg) under the skin every 14  "(fourteen) days. 6 mL 3    amLODIPine (Norvasc) 10 mg tablet Take 1 tablet (10 mg) by mouth once daily. 90 tablet 1    atorvastatin (Lipitor) 80 mg tablet TAKE 1 TABLET BY MOUTH ONCE DAILY. 90 tablet 1    hydroCHLOROthiazide (HYDRODiuril) 25 mg tablet TAKE 1 TABLET BY MOUTH EVERY DAY 90 tablet 0    losartan (Cozaar) 100 mg tablet TAKE 1 TABLET BY MOUTH EVERY DAY 90 tablet 1    tadalafil (Cialis) 10 mg tablet Take 1 tablet (10 mg) by mouth once daily as needed for erectile dysfunction. 12 tablet 3     No current facility-administered medications on file prior to visit.         RELEVANT LAB RESULTS:  Lab Results   Component Value Date    BILITOT 0.7 03/21/2024    CALCIUM 10.1 06/21/2023    CO2 24 06/21/2023     06/21/2023    CREATININE 0.82 06/21/2023    GLUCOSE 107 (H) 06/21/2023    ALKPHOS 57 03/21/2024    K 3.9 06/21/2023    PROT 7.3 03/21/2024     06/21/2023    AST 19 03/21/2024    ALT 25 03/21/2024    BUN 15 06/21/2023    ANIONGAP 15 06/21/2023    ALBUMIN 4.5 03/21/2024    GFRMALE >90 06/21/2023     Lab Results   Component Value Date    TRIG 148 03/21/2024    CHOL 90 03/21/2024    LDLCALC 19 03/21/2024    HDL 41.3 03/21/2024     No results found for: \"BMCBC\", \"CBCDIF\"     PHARMACEUTICAL ASSESSMENT:    MEDICATION RECONCILIATION    Drug Interactions? No    Medication Documentation Review Audit       Reviewed by Eli GirardD (Pharmacist) on 11/04/24 at 1056      Medication Order Taking? Sig Documenting Provider Last Dose Status   alirocumab (Praluent Pen) 150 mg/mL pen injector [id number] Yes Inject 1 mL (150 mg) under the skin every 14 (fourteen) days. Crow Fonseca MD  Active   amLODIPine (Norvasc) 10 mg tablet [id number] Yes Take 1 tablet (10 mg) by mouth once daily. Zhane Quinn DO  Active     Discontinued 10/31/24 1301   atorvastatin (Lipitor) 80 mg tablet [id number] Yes TAKE 1 TABLET BY MOUTH ONCE DAILY. Zhane Quinn, DO  Active   hydroCHLOROthiazide (HYDRODiuril) 25 mg tablet " [id number] Yes Take 1 tablet (25 mg) by mouth once daily. Zhane Quinn DO  Active   losartan (Cozaar) 100 mg tablet [id number] Yes TAKE 1 TABLET BY MOUTH EVERY DAY Zhane Quinn DO  Active   tadalafil (Cialis) 10 mg tablet [id number] Yes Take 1 tablet (10 mg) by mouth once daily as needed for erectile dysfunction. Zhane Quinn DO  Active                    DISEASE MANAGEMENT ASSESSMENT:     HYPERCHOLESTEROLEMIA ASSESSMENT    RECENT LIPID PANEL (DATE): 03/21/2024  Lab Results   Component Value Date    TRIG 148 03/21/2024    CHOL 90 03/21/2024    LDLCALC 19 03/21/2024    HDL 41.3 03/21/2024          ASCVD SCORE: The ASCVD Risk score (Daisha ROMAN, et al., 2019) failed to calculate for the following reasons:    The valid total cholesterol range is 130 to 320 mg/dL  Coronary Heart Disease (MI, angina, coronary artery stenosis): No   History of ischemic stroke? No   History of carotid artery stenosis? No   Peripheral artery disease? No   ASCVD RISK FACTORS:    CKD? No   Diabetes? No   HTN? Yes   Persistently elevated LDL? No   Elevated triglycerides? No   Inflammatory diseases (rheumatoid arthritis, psoriasis, HIV)? No    CURRENT PHARMACOTHERAPY  - Statin? Yes, describe: Atorvastatin 80mg daily  - Ezetimibe? No  - PCSK9-I? Yes, describe: Praluent 150mg every 14 days  - Other lipid lowering agents? No      RELEVANT PAST MEDICAL HISTORY:   HLD, HTN    ASSESSMENT    Affordability/Accessibility: [facility]  Adherence/Organization: Reports adherence  Adverse Effects: none   Recent Hospitalizations: No  Diet:   Breakfast: Coffee  Lunch: yogurt, soup, sandwich, frozen meal (I.e lean cuisine)  Dinner: meat carb, veggie/salad  Exercise: Yes, describe: Rides stationary bike 3-4x week for 30 minutes   Tobacco Use: No  Alcohol Use: Yes, describe: Couple glasses of wine couple times week  Next Lipid Panel: scheduled, to be obtained prior to next follow up visit    EDUCATION/COUNSELING:  - Counseled patient on MOA,  expectations, side effects, duration of therapy, contraindications, administration, and monitoring parameters  - Answered all patient questions and concerns     DISCUSSION/NOTES:   Patient states he is tolerating Praluent and atorvastatin with no adverse effects and reports compliance, injecting medication every 2 weeks. Patient diet and exercise remain unchanged.   Patient reports an issue with delivery for the medication because his unit # was not on the shipping address, [facility] added this to patient profile to prevent further mishaps.   Discussed refill process through Norbert Ribeiro verbalized understanding.   Patient is due for routine lipid panel monitoring, lab orders are entered. Patient follows up with Dr. Fonseca next week, he reports he will complete lab work at this appt.      ASSESSMENT:    Assessment/Plan   Problem List Items Addressed This Visit       Hyperlipidemia     Patient currently on atorvastatin 80mg and Praluent 150mg injection. Last lipid panel (03/2024), patient below LDL goal, appropriate to continue current regimen.   Patient to complete lipid panel during upcoming cardiology visit.            Relevant Orders    Referral to Clinical Pharmacy    Lipid Panel       RECOMMENDATIONS/PLAN:    CONTINUE  Praluent 150mg injection every 14 days  Atorvastatin 80mg daily    Last Appnt with Referring Provider: 12/13/2023  Next Appnt with Referring Provider: 12/16/2024  Clinical Pharmacist follow up: 2 months  VAF/Application Expiration: 11/08/2025  Type of Encounter: Alexa Will PharmD    Verbal consent to manage patient's drug therapy was obtained from the patient . They were informed they may decline to participate or withdraw from participation in pharmacy services at any time.    Continue all meds under the continuation of care with the referring provider and clinical pharmacy team.

## 2024-12-16 ENCOUNTER — HOSPITAL ENCOUNTER (OUTPATIENT)
Dept: CARDIOLOGY | Facility: CLINIC | Age: 69
Discharge: HOME | End: 2024-12-16
Payer: COMMERCIAL

## 2024-12-16 ENCOUNTER — OFFICE VISIT (OUTPATIENT)
Dept: CARDIOLOGY | Facility: CLINIC | Age: 69
End: 2024-12-16
Payer: COMMERCIAL

## 2024-12-16 VITALS
HEART RATE: 85 BPM | HEIGHT: 70 IN | TEMPERATURE: 97.9 F | BODY MASS INDEX: 26.86 KG/M2 | DIASTOLIC BLOOD PRESSURE: 86 MMHG | WEIGHT: 187.6 LBS | SYSTOLIC BLOOD PRESSURE: 161 MMHG

## 2024-12-16 DIAGNOSIS — R93.1 AGATSTON CORONARY ARTERY CALCIUM SCORE BETWEEN 200 AND 399: ICD-10-CM

## 2024-12-16 DIAGNOSIS — I25.10 CORONARY ARTERY DISEASE INVOLVING NATIVE CORONARY ARTERY OF NATIVE HEART WITHOUT ANGINA PECTORIS: ICD-10-CM

## 2024-12-16 DIAGNOSIS — E78.2 MIXED HYPERLIPIDEMIA: ICD-10-CM

## 2024-12-16 DIAGNOSIS — I71.21 ANEURYSM OF ASCENDING AORTA WITHOUT RUPTURE (CMS-HCC): ICD-10-CM

## 2024-12-16 DIAGNOSIS — I10 HYPERTENSION, ESSENTIAL: ICD-10-CM

## 2024-12-16 DIAGNOSIS — I71.20 THORACIC AORTIC ANEURYSM, WITHOUT RUPTURE, UNSPECIFIED (CMS-HCC): ICD-10-CM

## 2024-12-16 LAB
ALBUMIN SERPL BCP-MCNC: 4.2 G/DL (ref 3.4–5)
ALP SERPL-CCNC: 52 U/L (ref 33–136)
ALT SERPL W P-5'-P-CCNC: 14 U/L (ref 10–52)
ANION GAP SERPL CALC-SCNC: 12 MMOL/L (ref 10–20)
AORTIC VALVE PEAK VELOCITY: 1.78 M/S
AST SERPL W P-5'-P-CCNC: 14 U/L (ref 9–39)
AV PEAK GRADIENT: 13 MMHG
AVA (PEAK VEL): 2.87 CM2
BILIRUB DIRECT SERPL-MCNC: 0.1 MG/DL (ref 0–0.3)
BILIRUB SERPL-MCNC: 0.7 MG/DL (ref 0–1.2)
BUN SERPL-MCNC: 15 MG/DL (ref 6–23)
CALCIUM SERPL-MCNC: 9.6 MG/DL (ref 8.6–10.3)
CHLORIDE SERPL-SCNC: 105 MMOL/L (ref 98–107)
CHOLEST SERPL-MCNC: 88 MG/DL (ref 0–199)
CHOLESTEROL/HDL RATIO: 1.7
CO2 SERPL-SCNC: 27 MMOL/L (ref 21–32)
CREAT SERPL-MCNC: 0.88 MG/DL (ref 0.5–1.3)
EGFRCR SERPLBLD CKD-EPI 2021: >90 ML/MIN/1.73M*2
EJECTION FRACTION APICAL 4 CHAMBER: 65.1
EJECTION FRACTION: 70 %
GLUCOSE SERPL-MCNC: 103 MG/DL (ref 74–99)
HDLC SERPL-MCNC: 50.6 MG/DL
LDLC SERPL CALC-MCNC: 17 MG/DL
LEFT ATRIUM VOLUME AREA LENGTH INDEX BSA: 29 ML/M2
LEFT VENTRICLE INTERNAL DIMENSION DIASTOLE: 4.38 CM (ref 3.5–6)
LEFT VENTRICULAR OUTFLOW TRACT DIAMETER: 2.03 CM
MITRAL VALVE E/A RATIO: 1.4
NON HDL CHOLESTEROL: 37 MG/DL (ref 0–149)
POTASSIUM SERPL-SCNC: 3.7 MMOL/L (ref 3.5–5.3)
PROT SERPL-MCNC: 6.9 G/DL (ref 6.4–8.2)
RIGHT VENTRICLE FREE WALL PEAK S': 12 CM/S
RIGHT VENTRICLE PEAK SYSTOLIC PRESSURE: 29.8 MMHG
SODIUM SERPL-SCNC: 140 MMOL/L (ref 136–145)
TRICUSPID ANNULAR PLANE SYSTOLIC EXCURSION: 2.5 CM
TRIGL SERPL-MCNC: 101 MG/DL (ref 0–149)
VLDL: 20 MG/DL (ref 0–40)

## 2024-12-16 PROCEDURE — 1157F ADVNC CARE PLAN IN RCRD: CPT | Performed by: INTERNAL MEDICINE

## 2024-12-16 PROCEDURE — 36415 COLL VENOUS BLD VENIPUNCTURE: CPT | Performed by: INTERNAL MEDICINE

## 2024-12-16 PROCEDURE — 1160F RVW MEDS BY RX/DR IN RCRD: CPT | Performed by: INTERNAL MEDICINE

## 2024-12-16 PROCEDURE — 3079F DIAST BP 80-89 MM HG: CPT | Performed by: INTERNAL MEDICINE

## 2024-12-16 PROCEDURE — 99214 OFFICE O/P EST MOD 30 MIN: CPT | Mod: 25 | Performed by: INTERNAL MEDICINE

## 2024-12-16 PROCEDURE — 1159F MED LIST DOCD IN RCRD: CPT | Performed by: INTERNAL MEDICINE

## 2024-12-16 PROCEDURE — 93306 TTE W/DOPPLER COMPLETE: CPT

## 2024-12-16 PROCEDURE — 3008F BODY MASS INDEX DOCD: CPT | Performed by: INTERNAL MEDICINE

## 2024-12-16 PROCEDURE — 99214 OFFICE O/P EST MOD 30 MIN: CPT | Performed by: INTERNAL MEDICINE

## 2024-12-16 PROCEDURE — 93306 TTE W/DOPPLER COMPLETE: CPT | Performed by: INTERNAL MEDICINE

## 2024-12-16 PROCEDURE — 84075 ASSAY ALKALINE PHOSPHATASE: CPT | Performed by: INTERNAL MEDICINE

## 2024-12-16 PROCEDURE — 1126F AMNT PAIN NOTED NONE PRSNT: CPT | Performed by: INTERNAL MEDICINE

## 2024-12-16 PROCEDURE — 80061 LIPID PANEL: CPT | Performed by: INTERNAL MEDICINE

## 2024-12-16 PROCEDURE — 3077F SYST BP >= 140 MM HG: CPT | Performed by: INTERNAL MEDICINE

## 2024-12-16 PROCEDURE — 1036F TOBACCO NON-USER: CPT | Performed by: INTERNAL MEDICINE

## 2024-12-16 PROCEDURE — 80048 BASIC METABOLIC PNL TOTAL CA: CPT | Performed by: INTERNAL MEDICINE

## 2024-12-16 ASSESSMENT — PAIN SCALES - GENERAL: PAINLEVEL_OUTOF10: 0-NO PAIN

## 2024-12-16 NOTE — PROGRESS NOTES
Chief Complaint:   Coronary Artery Disease     History of Present Illness     Norbert Avalos is a 69 y.o. male presenting with for follow-up of preclinical coronary artery disease detected by coronary artery calcium scoring ([id number]=[id number] on 8/4/23).   The patient is tolerating guideline-directed medical therapy with antiplatelet and statin/Repatha medication and is compliant.  The patient exercises regularly and follows a heart healthy diet.  The patient has been well since their last office appointment and is not having any anginal symptoms or dyspnea on exertion.  Their last stress test was performed in 2023 and was normal.  3.9 cm TAA on CAC scan.    Review of Systems  All pertinent systems have been reviewed and are negative except for what is stated in the history of present illness.    All other systems have been reviewed and are negative and noncontributory to this patient's current ailments.  .       Previous History     Past Medical History:  He has a past medical history of Agatston coronary artery calcium score between 200 and 399 (12/13/2023), Aneurysm of ascending aorta without rupture (CMS-HCC) (12/13/2023), and Coronary artery disease involving native coronary artery of native heart without angina pectoris (12/13/2023).    Past Surgical History:  He has a past surgical history that includes Other surgical history (06/02/2022).      Social History:  He reports that he has never smoked. He has never used smokeless tobacco. No history on file for alcohol use and drug use.    Family History:  No family history on file.     Allergies:  Patient has no known allergies.    Outpatient Medications:  Current Outpatient Medications   Medication Instructions    amLODIPine (NORVASC) 10 mg, oral, Daily    atorvastatin (LIPITOR) 80 mg, oral, Daily    hydroCHLOROthiazide (HYDRODIURIL) 25 mg, oral, Daily    losartan (COZAAR) 100 mg, oral, Daily    Praluent Pen 150 mg, subcutaneous, Every 14 days    tadalafil (CIALIS) 10  "mg, oral, Daily PRN       Physical Examination   Vitals:  Visit Vitals  /86 (BP Location: Right arm, Patient Position: Sitting, BP Cuff Size: Adult)   Pulse 85   Temp 36.6 °C (97.9 °F) (Temporal)   Ht 1.778 m (5' 10\")   Wt 85.1 kg (187 lb 9.6 oz)   BMI 26.92 kg/m²   Smoking Status Never   BSA 2.05 m²    Physical Exam  Vitals reviewed.   Constitutional:       General: He is not in acute distress.     Appearance: Normal appearance.   HENT:      Head: Normocephalic and atraumatic.      Nose: Nose normal.   Eyes:      Conjunctiva/sclera: Conjunctivae normal.   Cardiovascular:      Rate and Rhythm: Normal rate and regular rhythm.      Pulses: Normal pulses.      Heart sounds: Murmur heard.      Systolic murmur is present with a grade of 1/6.   Pulmonary:      Effort: Pulmonary effort is normal. No respiratory distress.      Breath sounds: Normal breath sounds. No wheezing, rhonchi or rales.   Abdominal:      General: Bowel sounds are normal. There is no distension.      Palpations: Abdomen is soft.      Tenderness: There is no abdominal tenderness.   Musculoskeletal:         General: No swelling.      Right lower leg: No edema.      Left lower leg: No edema.   Skin:     General: Skin is warm and dry.      Capillary Refill: Capillary refill takes less than 2 seconds.   Neurological:      General: No focal deficit present.      Mental Status: He is alert.   Psychiatric:         Mood and Affect: Mood normal.             Labs/Imaging/Cardiac Studies   I have personally reviewed the patient's available lab work, primary care appointment notes, pertinent imaging studies, and cardiac studies and have discussed them and my independent interpretation of those results with the patient and caregiver at this appointment.  All pertinent recent Emergency Department evaluations and Hospital admissions were also reviewed in detail with the patient and caregiver.    Reviewed Echo todat, CAC scan, Labs.    Echo:  No echocardiogram " results found for the past 12 months       Assessment and Recommendations     Assessment/Plan       1. Mixed hyperlipidemia  The patient's lipids are well controlled on chronic atorvastatin/rRepatha therapy and they are meeting their goal LDL cholesterol per the ACC/AHA guidelines.      - Follow Up In Cardiology    2. Hypertension, essential  The patient's blood pressure has been well-controlled at today's appointment or by recent primary care provider's measurements/home measurements and meets their goal blood pressure per the ACC/AHA guidelines.  The patient has been compliant with their anti-hypertensive medications and is following a low sodium/DASH diet. I advised continuation of their present medical treatment and lifestyle modification.      - Follow Up In Cardiology    3. Coronary artery disease involving native coronary artery of native heart without angina pectoris  The patient's CAD, as detailed in the HPI, has been clinically stable, without any anginal symptoms or dyspnea.  The patient will continue treatment with guideline-directed medical therapy with antiplatelet (ASA) and statin medications and was advised regular exercise and a heart healthy diet.    '  - Follow Up In Cardiology    4. Agatston coronary artery calcium score between 200 and 399    - Follow Up In Cardiology    5. Aneurysm of ascending aorta without rupture (CMS-HCC)  3.9 cm ascending aorta by CAC scan and normal today by Echo. CTA chest with contrast.  - Follow Up In Cardiology     Norbert Avalos will return in 1 year for an office visit.       Crow Fonseca MD    Exclusive of any other services or procedures performed, I, Crow Fonseca MD , spent 30 minutes in duration for this visit today.  This time consisted of chart review, obtaining history, and/or performing the exam as documented above as well as documenting the clinical information for the encounter in the electronic record, discussing treatment options, plans, and/or goals  with patient, family, and/or caregiver, refilling medications, updating the electronic record, ordering medicines, lab work, imaging, referrals, and/or procedures as documented above and communicating with other [facility] professionals. I have discussed the results of laboratory, radiology, and cardiology studies with the patient and their family/caregiver.

## 2025-01-03 DIAGNOSIS — I10 HYPERTENSION, ESSENTIAL: Primary | ICD-10-CM

## 2025-01-03 RX ORDER — AMLODIPINE BESYLATE 10 MG/1
10 TABLET ORAL DAILY
Qty: 90 TABLET | Refills: 0 | Status: SHIPPED | OUTPATIENT
Start: 2025-01-03

## 2025-01-07 ENCOUNTER — APPOINTMENT (OUTPATIENT)
Dept: RADIOLOGY | Facility: HOSPITAL | Age: 70
End: 2025-01-07
Payer: COMMERCIAL

## 2025-01-08 DIAGNOSIS — I10 HYPERTENSION, ESSENTIAL: ICD-10-CM

## 2025-01-09 RX ORDER — LOSARTAN POTASSIUM 100 MG/1
100 TABLET ORAL DAILY
Qty: 90 TABLET | Refills: 1 | OUTPATIENT
Start: 2025-01-09

## 2025-01-10 RX ORDER — LOSARTAN POTASSIUM 100 MG/1
100 TABLET ORAL DAILY
Qty: 30 TABLET | Refills: 1 | Status: SHIPPED | OUTPATIENT
Start: 2025-01-10 | End: 2025-03-11

## 2025-01-16 ENCOUNTER — HOSPITAL ENCOUNTER (OUTPATIENT)
Dept: RADIOLOGY | Facility: HOSPITAL | Age: 70
Discharge: HOME | End: 2025-01-16
Payer: COMMERCIAL

## 2025-01-16 ENCOUNTER — TELEPHONE (OUTPATIENT)
Dept: CARDIOLOGY | Facility: CLINIC | Age: 70
End: 2025-01-16
Payer: COMMERCIAL

## 2025-01-16 DIAGNOSIS — R93.1 AGATSTON CORONARY ARTERY CALCIUM SCORE BETWEEN 200 AND 399: ICD-10-CM

## 2025-01-16 DIAGNOSIS — I71.21 ANEURYSM OF ASCENDING AORTA WITHOUT RUPTURE (CMS-HCC): ICD-10-CM

## 2025-01-16 DIAGNOSIS — E78.2 MIXED HYPERLIPIDEMIA: ICD-10-CM

## 2025-01-16 DIAGNOSIS — N28.1 RENAL CYST: Primary | ICD-10-CM

## 2025-01-16 DIAGNOSIS — I25.10 CORONARY ARTERY DISEASE INVOLVING NATIVE CORONARY ARTERY OF NATIVE HEART WITHOUT ANGINA PECTORIS: ICD-10-CM

## 2025-01-16 DIAGNOSIS — I10 HYPERTENSION, ESSENTIAL: ICD-10-CM

## 2025-01-16 LAB
CREAT SERPL-MCNC: 0.52 MG/DL (ref 0.6–1.3)
GFR SERPL CREATININE-BSD FRML MDRD: >90 ML/MIN/1.73M*2

## 2025-01-16 PROCEDURE — 71275 CT ANGIOGRAPHY CHEST: CPT

## 2025-01-16 PROCEDURE — 71275 CT ANGIOGRAPHY CHEST: CPT | Performed by: RADIOLOGY

## 2025-01-16 PROCEDURE — 2550000001 HC RX 255 CONTRASTS: Performed by: INTERNAL MEDICINE

## 2025-01-16 PROCEDURE — 82565 ASSAY OF CREATININE: CPT

## 2025-01-16 RX ADMIN — IOHEXOL 75 ML: 350 INJECTION, SOLUTION INTRAVENOUS at 09:35

## 2025-01-16 NOTE — TELEPHONE ENCOUNTER
----- Message from Crow Fonseca sent at 1/16/2025 12:56 PM EST -----  CTA aorta is normal - no aneurysm.  Cyst in kidneys - schedule ultrasound - ordered  ----- Message -----  From: Interface, Radiology Results In  Sent: 1/16/2025  11:23 AM EST  To: Crow Fonseca MD

## 2025-01-21 ENCOUNTER — HOSPITAL ENCOUNTER (OUTPATIENT)
Dept: RADIOLOGY | Facility: CLINIC | Age: 70
Discharge: HOME | End: 2025-01-21
Payer: COMMERCIAL

## 2025-01-21 DIAGNOSIS — N28.1 RENAL CYST: ICD-10-CM

## 2025-01-21 PROCEDURE — 76770 US EXAM ABDO BACK WALL COMP: CPT | Performed by: STUDENT IN AN ORGANIZED HEALTH CARE EDUCATION/TRAINING PROGRAM

## 2025-01-21 PROCEDURE — 76770 US EXAM ABDO BACK WALL COMP: CPT

## 2025-01-22 ENCOUNTER — TELEPHONE (OUTPATIENT)
Dept: CARDIOLOGY | Facility: CLINIC | Age: 70
End: 2025-01-22
Payer: COMMERCIAL

## 2025-01-22 DIAGNOSIS — N28.1 RENAL CYST: Primary | ICD-10-CM

## 2025-01-22 NOTE — TELEPHONE ENCOUNTER
----- Message from Crow Fonseca sent at 1/22/2025 12:11 PM EST -----  Renal ultrasound shows fluid around the left kidney - I referred him to urology.  ----- Message -----  From: Interface, Radiology Results In  Sent: 1/22/2025  12:04 PM EST  To: Crow Fonseca MD

## 2025-01-27 ENCOUNTER — APPOINTMENT (OUTPATIENT)
Dept: PHARMACY | Facility: HOSPITAL | Age: 70
End: 2025-01-27
Payer: COMMERCIAL

## 2025-01-27 DIAGNOSIS — E78.2 MIXED HYPERLIPIDEMIA: Primary | ICD-10-CM

## 2025-01-27 NOTE — ASSESSMENT & PLAN NOTE
Patient currently on atorvastatin 80mg and Praluent 150mg injection. Last lipid panel (12/2024), patient below LDL goal, appropriate to continue current regimen.

## 2025-01-27 NOTE — PROGRESS NOTES
Pharmacist Clinic: Cardiology Management    Norbert Avalos is a 69 y.o. male was referred to Clinical Pharmacy Team for cholesterol management.     Referring Provider: Crow Fonseca MD    THIS IS A FOLLOW UP PATIENT APPOINTMENT. AT LAST VISIT ON 12/09/2024 WITH PHARMACIST (Casandra Will).    REVIEW OF LAST APPT  Patient states he is tolerating Praluent and atorvastatin with no adverse effects and reports compliance, injecting medication every 2 weeks. Patient diet and exercise remain unchanged.   Patient reports an issue with delivery for the medication because his unit # was not on the shipping address, [facility] added this to patient profile to prevent further mishaps.   Discussed refill process through Norbert Ribeiro verbalized understanding.   Patient is due for routine lipid panel monitoring, lab orders are entered. Patient follows up with Dr. Fonseca next week, he reports he will complete lab work at this appt.    Appointment was completed by Norbert who was reached at primary number.    Allergies Reviewed? No    No Known Allergies    Past Medical History:   Diagnosis Date    Agatston coronary artery calcium score between 200 and 399 12/13/2023    Aneurysm of ascending aorta without rupture (CMS-HCC) 12/13/2023    3.9 cm    Coronary artery disease involving native coronary artery of native heart without angina pectoris 12/13/2023       Current Outpatient Medications on File Prior to Visit   Medication Sig Dispense Refill    alirocumab (Praluent Pen) 150 mg/mL pen injector Inject 1 mL (150 mg) under the skin every 14 (fourteen) days. 6 mL 3    amLODIPine (Norvasc) 10 mg tablet Take 1 tablet (10 mg) by mouth once daily. 90 tablet 0    atorvastatin (Lipitor) 80 mg tablet TAKE 1 TABLET BY MOUTH ONCE DAILY. 90 tablet 1    hydroCHLOROthiazide (HYDRODiuril) 25 mg tablet TAKE 1 TABLET BY MOUTH EVERY DAY 90 tablet 0    losartan (Cozaar) 100 mg tablet Take 1 tablet (100 mg) by mouth once daily. 30 tablet 1    tadalafil  "(Cialis) 10 mg tablet Take 1 tablet (10 mg) by mouth once daily as needed for erectile dysfunction. 12 tablet 3     No current facility-administered medications on file prior to visit.         RELEVANT LAB RESULTS:  Lab Results   Component Value Date    BILITOT 0.7 12/16/2024    CALCIUM 9.6 12/16/2024    CO2 27 12/16/2024     12/16/2024    CREATININE 0.88 12/16/2024    GLUCOSE 103 (H) 12/16/2024    ALKPHOS 52 12/16/2024    K 3.7 12/16/2024    PROT 6.9 12/16/2024     12/16/2024    AST 14 12/16/2024    ALT 14 12/16/2024    BUN 15 12/16/2024    ANIONGAP 12 12/16/2024    ALBUMIN 4.2 12/16/2024    GFRMALE >90 06/21/2023     Lab Results   Component Value Date    TRIG 101 12/16/2024    CHOL 88 12/16/2024    LDLCALC 17 12/16/2024    HDL 50.6 12/16/2024     No results found for: \"BMCBC\", \"CBCDIF\"     PHARMACEUTICAL ASSESSMENT:    MEDICATION RECONCILIATION    Drug Interactions? No    Medication Documentation Review Audit       Reviewed by Crow Fonseca MD (Physician) on 12/16/24 at 0840      Medication Order Taking? Sig Documenting Provider Last Dose Status   alirocumab (Praluent Pen) 150 mg/mL pen injector [id number] Yes Inject 1 mL (150 mg) under the skin every 14 (fourteen) days. Crow Fonseca MD  Active   amLODIPine (Norvasc) 10 mg tablet [id number] Yes Take 1 tablet (10 mg) by mouth once daily. Zhane Quinn DO  Active   atorvastatin (Lipitor) 80 mg tablet [id number] Yes TAKE 1 TABLET BY MOUTH ONCE DAILY. Zhane Quinn DO  Active   hydroCHLOROthiazide (HYDRODiuril) 25 mg tablet [id number] Yes TAKE 1 TABLET BY MOUTH EVERY DAY Zhane Quinn DO  Active   losartan (Cozaar) 100 mg tablet [id number] Yes TAKE 1 TABLET BY MOUTH EVERY DAY Zhane Quinn DO  Active   tadalafil (Cialis) 10 mg tablet [id number] Yes Take 1 tablet (10 mg) by mouth once daily as needed for erectile dysfunction. Zhane Quinn, DO  Active                    DISEASE MANAGEMENT ASSESSMENT:     HYPERCHOLESTEROLEMIA " ASSESSMENT    RECENT LIPID PANEL (DATE): 12/16/2024  Lab Results   Component Value Date    TRIG 101 12/16/2024    CHOL 88 12/16/2024    LDLCALC 17 12/16/2024    HDL 50.6 12/16/2024          ASCVD SCORE: The ASCVD Risk score (Daisha ROMAN, et al., 2019) failed to calculate for the following reasons:    The valid total cholesterol range is 130 to 320 mg/dL  Coronary Heart Disease (MI, angina, coronary artery stenosis): No   History of ischemic stroke? No   History of carotid artery stenosis? No   Peripheral artery disease? No   ASCVD RISK FACTORS:    CKD? No   Diabetes? No   HTN? Yes   Persistently elevated LDL? No   Elevated triglycerides? No   Inflammatory diseases (rheumatoid arthritis, psoriasis, HIV)? No    CURRENT PHARMACOTHERAPY  - Statin? Yes, describe: Atorvastatin 80mg daily  - Ezetimibe? No  - PCSK9-I? Yes, describe: Praluent 150mg every 14 days  - Other lipid lowering agents? No      RELEVANT PAST MEDICAL HISTORY:   HLD, HTN    ASSESSMENT    Affordability/Accessibility: [facility]  Adherence/Organization: Reports adherence  Adverse Effects: none reported  Recent Hospitalizations: No  Diet:   Breakfast: Coffee  Lunch: yogurt, soup, sandwich, frozen meal (I.e lean cuisine)  Dinner: meat carb, veggie/salad  Exercise: Yes, describe: Rides stationary bike 3-4x week for 30 minutes   Tobacco Use: No  Alcohol Use: Yes, describe: Couple glasses of wine couple times week  Next Lipid Panel: 12 months    EDUCATION/COUNSELING:  - Counseled patient on MOA, expectations, side effects, duration of therapy, contraindications, administration, and monitoring parameters  - Answered all patient questions and concerns     DISCUSSION/NOTES:   Patient states he is tolerating Praluent and atorvastatin with no adverse effects and reports compliance, injecting medication every 2 weeks. Patient diet and exercise remain unchanged.   Patient recently completed lipid panel, he is at goal for his cholesterol, plan to continue current  regimen.      ASSESSMENT:    Assessment/Plan   Problem List Items Addressed This Visit       Hyperlipidemia - Primary     Patient currently on atorvastatin 80mg and Praluent 150mg injection. Last lipid panel (12/2024), patient below LDL goal, appropriate to continue current regimen.                Relevant Orders    Referral to Clinical Pharmacy         RECOMMENDATIONS/PLAN:    CONTINUE  Praluent 150mg injection every 14 days  Atorvastatin 80mg daily    Last Appnt with Referring Provider: 12/16/2024  Next Appnt with Referring Provider: 12/17/2025  Clinical Pharmacist follow up: 6 months  VAF/Application Expiration: 11/08/2025  Type of Encounter: Alexa Will PharmD    Verbal consent to manage patient's drug therapy was obtained from the patient . They were informed they may decline to participate or withdraw from participation in pharmacy services at any time.    Continue all meds under the continuation of care with the referring provider and clinical pharmacy team.

## 2025-01-27 NOTE — Clinical Note
Jose Fonseca,  Today I spoke with Norbert about his cholesterol medications. Patient states he is tolerating Praluent and atorvastatin with no adverse effects and reports compliance, injecting medication every 2 weeks. Patient diet and exercise remains unchanged. Plan to continue current regimen and follow up in 6 months, his lipid panel looks great. Thank you!

## 2025-02-21 DIAGNOSIS — I10 HYPERTENSION, ESSENTIAL: ICD-10-CM

## 2025-02-21 RX ORDER — HYDROCHLOROTHIAZIDE 25 MG/1
25 TABLET ORAL DAILY
Qty: 90 TABLET | Refills: 0 | Status: SHIPPED | OUTPATIENT
Start: 2025-02-21

## 2025-02-27 NOTE — PROGRESS NOTES
Subjective   Patient ID: Norbert Avalos is a 69 y.o. male    HPI  69 y.o. male who presents to establish for renal cyst. He was referred by Dr. Fonseca following a CT chest 01/2025 that revealed an indeterminate cystic lesion in the in the left renal pelvis. While this may represent high attenuation parapelvic cyst, further evaluation with dedicated renal ultrasound is recommended to exclude left renal lesion. Renal US 01/2025 showed Anechoic fluid echogenicity areas occupying the left renal pelvis with dilated extrarenal pelvis measuring 2 cm. Findings could represent moderate hydroureteronephrosis, or alternatively parapelvic cysts. Advise further assessment by dedicated CT urogram.    Today, he denies any urinary symptoms such as urgency, frequency, hematuria, pain, burning, and infections. He does feel as if he is emptying his bladder. Denies any left sided pain. He reports some erectile dysfunctions that is controlled with medication.       The most recent PSA, conducted on 06/21/2023, revealed:  2.88 ng/mL    The most recent Renal US, conducted on 01/21/2025, revealed:  1. Anechoic fluid echogenicity areas occupying the left renal pelvis  with dilated extrarenal pelvis measuring 2 cm. Findings could  represent moderate hydroureteronephrosis, or alternatively parapelvic  cysts. Advise further assessment by dedicated CT urogram.    The most recent CT angio chest w and wo IV contrast, conducted on 01/16/2025, revealed:  1.  The thoracic aorta is normal in course, caliber, and contour. The  thoracic aorta is nonaneurysmal. There is moderate atherosclerotic  plaque of the visualized thoracic aorta.  2. There is moderate to severe atherosclerotic calcifications of the  left coronary artery.  3. There is an indeterminate cystic lesion in the in the left renal  pelvis. While this may represent high attenuation parapelvic cyst,  further evaluation with dedicated renal ultrasound is recommended to  exclude left renal  lesion.      Review of Systems    All systems were reviewed. Anything negative was noted in the HPI.    Objective   Physical Exam    General: Well developed, well nourished, alert and cooperative, appears in no acute distress   Eyes: Non-injected conjunctiva, sclera clear, no proptosis   Cardiac: Extremities are warm and well perfused. No edema, cyanosis or pallor   Lungs: Breathing is easy, non-labored. Speaking in clear and complete sentences. Normal diaphragmatic movement   MSK: Ambulatory with steady gait, unassisted   Neuro: Alert and oriented to person, place, and time   Psych: Demonstrates good judgment and reason, without hallucinations, abnormal affect or abnormal behaviors   Skin: No obvious lesions, no rashes       No CVA tenderness bilaterally   No suprapubic pain or discomfort       Past Medical History:   Diagnosis Date    Agatston coronary artery calcium score between 200 and 399 12/13/2023    Aneurysm of ascending aorta without rupture (CMS-HCC) 12/13/2023    3.9 cm    Coronary artery disease involving native coronary artery of native heart without angina pectoris 12/13/2023         Past Surgical History:   Procedure Laterality Date    OTHER SURGICAL HISTORY  06/02/2022    No history of surgery       Assessment/Plan   Renal fluid collection on US left renal pelvis     69 y.o. male who presents for the above condition, We had a very long and extensive discussion with the patient regarding the pathophysiology, differential diagnosis, risk factor, management, natural history, incidence and diagnostic work-up of the condition.      Plan:  - CT Urogram  - Follow-up with results    E&M visit today is associated with current or anticipated ongoing medical care services related to a patient's single, serious condition or a complex condition.     3/4/2025    Scribe Attestation  By signing my name below, Narda ALMEIDA Scribe attest that this documentation has been prepared under the direction and in the  presence of Dr. Gary Loaiza.

## 2025-03-04 ENCOUNTER — OFFICE VISIT (OUTPATIENT)
Dept: UROLOGY | Facility: HOSPITAL | Age: 70
End: 2025-03-04
Payer: COMMERCIAL

## 2025-03-04 ENCOUNTER — APPOINTMENT (OUTPATIENT)
Dept: PRIMARY CARE | Facility: CLINIC | Age: 70
End: 2025-03-04

## 2025-03-04 VITALS
SYSTOLIC BLOOD PRESSURE: 130 MMHG | WEIGHT: 186 LBS | HEIGHT: 70 IN | DIASTOLIC BLOOD PRESSURE: 80 MMHG | HEART RATE: 86 BPM | BODY MASS INDEX: 26.63 KG/M2 | OXYGEN SATURATION: 97 %

## 2025-03-04 DIAGNOSIS — I10 HYPERTENSION, ESSENTIAL: ICD-10-CM

## 2025-03-04 DIAGNOSIS — R73.03 PREDIABETES: ICD-10-CM

## 2025-03-04 DIAGNOSIS — Z12.5 PROSTATE CANCER SCREENING: ICD-10-CM

## 2025-03-04 DIAGNOSIS — N28.1 RENAL CYST: ICD-10-CM

## 2025-03-04 DIAGNOSIS — N52.9 MALE ERECTILE DISORDER: ICD-10-CM

## 2025-03-04 DIAGNOSIS — Z00.00 ENCOUNTER FOR PREVENTIVE HEALTH EXAMINATION: ICD-10-CM

## 2025-03-04 DIAGNOSIS — R31.0 GROSS HEMATURIA: Primary | ICD-10-CM

## 2025-03-04 DIAGNOSIS — E78.2 MIXED HYPERLIPIDEMIA: ICD-10-CM

## 2025-03-04 DIAGNOSIS — Z00.00 ENCOUNTER FOR ANNUAL WELLNESS EXAM IN MEDICARE PATIENT: Primary | ICD-10-CM

## 2025-03-04 PROCEDURE — 1159F MED LIST DOCD IN RCRD: CPT | Performed by: STUDENT IN AN ORGANIZED HEALTH CARE EDUCATION/TRAINING PROGRAM

## 2025-03-04 PROCEDURE — 99214 OFFICE O/P EST MOD 30 MIN: CPT | Performed by: STUDENT IN AN ORGANIZED HEALTH CARE EDUCATION/TRAINING PROGRAM

## 2025-03-04 PROCEDURE — 1170F FXNL STATUS ASSESSED: CPT | Performed by: STUDENT IN AN ORGANIZED HEALTH CARE EDUCATION/TRAINING PROGRAM

## 2025-03-04 PROCEDURE — G2211 COMPLEX E/M VISIT ADD ON: HCPCS | Performed by: STUDENT IN AN ORGANIZED HEALTH CARE EDUCATION/TRAINING PROGRAM

## 2025-03-04 PROCEDURE — 99397 PER PM REEVAL EST PAT 65+ YR: CPT | Performed by: STUDENT IN AN ORGANIZED HEALTH CARE EDUCATION/TRAINING PROGRAM

## 2025-03-04 PROCEDURE — 3079F DIAST BP 80-89 MM HG: CPT | Performed by: STUDENT IN AN ORGANIZED HEALTH CARE EDUCATION/TRAINING PROGRAM

## 2025-03-04 PROCEDURE — 3008F BODY MASS INDEX DOCD: CPT | Performed by: STUDENT IN AN ORGANIZED HEALTH CARE EDUCATION/TRAINING PROGRAM

## 2025-03-04 PROCEDURE — G0439 PPPS, SUBSEQ VISIT: HCPCS | Performed by: STUDENT IN AN ORGANIZED HEALTH CARE EDUCATION/TRAINING PROGRAM

## 2025-03-04 PROCEDURE — 1157F ADVNC CARE PLAN IN RCRD: CPT | Performed by: STUDENT IN AN ORGANIZED HEALTH CARE EDUCATION/TRAINING PROGRAM

## 2025-03-04 PROCEDURE — 3075F SYST BP GE 130 - 139MM HG: CPT | Performed by: STUDENT IN AN ORGANIZED HEALTH CARE EDUCATION/TRAINING PROGRAM

## 2025-03-04 PROCEDURE — 1036F TOBACCO NON-USER: CPT | Performed by: STUDENT IN AN ORGANIZED HEALTH CARE EDUCATION/TRAINING PROGRAM

## 2025-03-04 PROCEDURE — 99204 OFFICE O/P NEW MOD 45 MIN: CPT | Performed by: STUDENT IN AN ORGANIZED HEALTH CARE EDUCATION/TRAINING PROGRAM

## 2025-03-04 PROCEDURE — 1124F ACP DISCUSS-NO DSCNMKR DOCD: CPT | Performed by: STUDENT IN AN ORGANIZED HEALTH CARE EDUCATION/TRAINING PROGRAM

## 2025-03-04 PROCEDURE — 1160F RVW MEDS BY RX/DR IN RCRD: CPT | Performed by: STUDENT IN AN ORGANIZED HEALTH CARE EDUCATION/TRAINING PROGRAM

## 2025-03-04 PROCEDURE — 99213 OFFICE O/P EST LOW 20 MIN: CPT | Performed by: STUDENT IN AN ORGANIZED HEALTH CARE EDUCATION/TRAINING PROGRAM

## 2025-03-04 RX ORDER — HYDROCHLOROTHIAZIDE 25 MG/1
25 TABLET ORAL DAILY
Qty: 90 TABLET | Refills: 1 | Status: SHIPPED | OUTPATIENT
Start: 2025-03-04

## 2025-03-04 RX ORDER — ATORVASTATIN CALCIUM 80 MG/1
80 TABLET, FILM COATED ORAL DAILY
Qty: 90 TABLET | Refills: 1 | Status: SHIPPED | OUTPATIENT
Start: 2025-03-04

## 2025-03-04 RX ORDER — LOSARTAN POTASSIUM 100 MG/1
100 TABLET ORAL DAILY
Qty: 90 TABLET | Refills: 1 | Status: SHIPPED | OUTPATIENT
Start: 2025-03-04 | End: 2025-08-31

## 2025-03-04 RX ORDER — AMLODIPINE BESYLATE 10 MG/1
10 TABLET ORAL DAILY
Qty: 90 TABLET | Refills: 1 | Status: SHIPPED | OUTPATIENT
Start: 2025-03-04

## 2025-03-04 RX ORDER — SILDENAFIL 100 MG/1
100 TABLET, FILM COATED ORAL DAILY PRN
Qty: 12 TABLET | Refills: 3 | Status: SHIPPED | OUTPATIENT
Start: 2025-03-04 | End: 2026-03-04

## 2025-03-04 ASSESSMENT — PATIENT HEALTH QUESTIONNAIRE - PHQ9
9. THOUGHTS THAT YOU WOULD BE BETTER OFF DEAD, OR OF HURTING YOURSELF: NOT AT ALL
6. FEELING BAD ABOUT YOURSELF - OR THAT YOU ARE A FAILURE OR HAVE LET YOURSELF OR YOUR FAMILY DOWN: NOT AT ALL
SUM OF ALL RESPONSES TO PHQ QUESTIONS 1-9: 0
10. IF YOU CHECKED OFF ANY PROBLEMS, HOW DIFFICULT HAVE THESE PROBLEMS MADE IT FOR YOU TO DO YOUR WORK, TAKE CARE OF THINGS AT HOME, OR GET ALONG WITH OTHER PEOPLE: NOT DIFFICULT AT ALL
SUM OF ALL RESPONSES TO PHQ9 QUESTIONS 1 AND 2: 0
5. POOR APPETITE OR OVEREATING: NOT AT ALL
3. TROUBLE FALLING OR STAYING ASLEEP OR SLEEPING TOO MUCH: NOT AT ALL
8. MOVING OR SPEAKING SO SLOWLY THAT OTHER PEOPLE COULD HAVE NOTICED. OR THE OPPOSITE, BEING SO FIGETY OR RESTLESS THAT YOU HAVE BEEN MOVING AROUND A LOT MORE THAN USUAL: NOT AT ALL
1. LITTLE INTEREST OR PLEASURE IN DOING THINGS: NOT AT ALL
4. FEELING TIRED OR HAVING LITTLE ENERGY: NOT AT ALL
2. FEELING DOWN, DEPRESSED OR HOPELESS: NOT AT ALL
7. TROUBLE CONCENTRATING ON THINGS, SUCH AS READING THE NEWSPAPER OR WATCHING TELEVISION: NOT AT ALL

## 2025-03-04 ASSESSMENT — ANXIETY QUESTIONNAIRES
6. BECOMING EASILY ANNOYED OR IRRITABLE: NOT AT ALL
7. FEELING AFRAID AS IF SOMETHING AWFUL MIGHT HAPPEN: NOT AT ALL
GAD7 TOTAL SCORE: 0
1. FEELING NERVOUS, ANXIOUS, OR ON EDGE: NOT AT ALL
5. BEING SO RESTLESS THAT IT IS HARD TO SIT STILL: NOT AT ALL
4. TROUBLE RELAXING: NOT AT ALL
2. NOT BEING ABLE TO STOP OR CONTROL WORRYING: NOT AT ALL
3. WORRYING TOO MUCH ABOUT DIFFERENT THINGS: NOT AT ALL
IF YOU CHECKED OFF ANY PROBLEMS ON THIS QUESTIONNAIRE, HOW DIFFICULT HAVE THESE PROBLEMS MADE IT FOR YOU TO DO YOUR WORK, TAKE CARE OF THINGS AT HOME, OR GET ALONG WITH OTHER PEOPLE: NOT DIFFICULT AT ALL

## 2025-03-04 ASSESSMENT — ACTIVITIES OF DAILY LIVING (ADL)
MANAGING_FINANCES: INDEPENDENT
DOING_HOUSEWORK: INDEPENDENT
TAKING_MEDICATION: INDEPENDENT
BATHING: INDEPENDENT
GROCERY_SHOPPING: INDEPENDENT
DRESSING: INDEPENDENT

## 2025-03-04 ASSESSMENT — ENCOUNTER SYMPTOMS
DEPRESSION: 0
LOSS OF SENSATION IN FEET: 0
OCCASIONAL FEELINGS OF UNSTEADINESS: 0

## 2025-03-04 NOTE — PROGRESS NOTES
Chief Complaint  Reason for Visit: Norbert Avalos is an 69 y.o. male here for a Medicare Annual Wellness Visit Subsequent.     Past Medical, Surgical, and Family History reviewed and updated in chart.    Reviewed all medications by prescribing practitioner or clinical pharmacist (such as prescriptions, OTCs, herbal therapies and supplements) and documented in the medical record.    History of Present Illness  1.  Medicare wellness/physical exam.  Colonoscopy: last done unsure. Cologuard in 2023, negative with 3 year clearance.  Immunizations: Need RSV, Tdap, shingles, influenza. Patient does not want any vaccines today.    Norbert is here for [facility] Wellness visit and would like to switch medications. He does not need refills at this time. His BP this visit 130/80. He checks at home and gets the same numbers as in the office today.     2. Erectile dysfunction  Norbert has been taking Cialis 10 mg, and used to take Viagra 100 mg daily 2 years prior which worked better. He would like to switch back to Viagra 100 mg daily as it worked better for him.     Review of Systems  All pertinent positive symptoms are included in the history of present illness.    All other systems have been reviewed and are negative and noncontributory to this patient's current ailments.    Past Medical History  He has a past medical history of Agatston coronary artery calcium score between 200 and 399 (12/13/2023), Aneurysm of ascending aorta without rupture (CMS-HCC) (12/13/2023), and Coronary artery disease involving native coronary artery of native heart without angina pectoris (12/13/2023).    Surgical History  He has a past surgical history that includes Other surgical history (06/02/2022).     Social History  He reports that he has never smoked. He has never used smokeless tobacco. He reports current alcohol use. He reports that he does not currently use drugs.    Family History  has no family status information on file.     Outpatient  "Medications Prior to Visit   Medication Sig Dispense Refill    alirocumab (Praluent Pen) 150 mg/mL pen injector Inject 1 mL (150 mg) under the skin every 14 (fourteen) days. 6 mL 3    amLODIPine (Norvasc) 10 mg tablet Take 1 tablet (10 mg) by mouth once daily. 90 tablet 0    atorvastatin (Lipitor) 80 mg tablet TAKE 1 TABLET BY MOUTH ONCE DAILY. 90 tablet 1    hydroCHLOROthiazide (HYDRODiuril) 25 mg tablet TAKE 1 TABLET BY MOUTH EVERY DAY 90 tablet 0    losartan (Cozaar) 100 mg tablet Take 1 tablet (100 mg) by mouth once daily. 30 tablet 1    tadalafil (Cialis) 10 mg tablet Take 1 tablet (10 mg) by mouth once daily as needed for erectile dysfunction. 12 tablet 3     No facility-administered medications prior to visit.     Allergies  Patient has no known allergies.    Immunization History   Administered Date(s) Administered    Hepatitis B vaccine, adult *Check Product/Dose* 08/21/2002    Pfizer Purple Cap SARS-CoV-2 06/30/2021, 07/21/2021, 01/23/2022    Pneumococcal conjugate vaccine, 20-valent (PREVNAR 20) 01/10/2024    Tdap vaccine, age 7 year and older (BOOSTRIX, ADACEL) 06/08/2012     Objective   Visit Vitals  /80   Pulse 86   Ht 1.778 m (5' 10\")   Wt 84.4 kg (186 lb)   SpO2 97%   BMI 26.69 kg/m²   Smoking Status Never   BSA 2.04 m²        BP Readings from Last 3 Encounters:   03/04/25 130/80   12/16/24 161/86   05/09/24 136/80      Wt Readings from Last 3 Encounters:   03/04/25 84.4 kg (186 lb)   12/16/24 85.1 kg (187 lb 9.6 oz)   05/09/24 85.7 kg (189 lb)      Vision  No results found.    Patient Self Assessment of Health Status  Patient Self Assessment: Good    Nutrition and Exercise  Current Diet: Well Balanced Diet  Adequate Fluid Intake: Yes  Caffeine: Yes  Exercise Frequency: Regularly    Functional Ability/Level of Safety  Cognitive Impairment Observed: No cognitive impairment observed  Cognitive Impairment Reported: No cognitive impairment reported by patient or family    Home Safety Risk Factors: " None    Relevant Results  No visits with results within 1 Month(s) from this visit.   Latest known visit with results is:   Hospital Outpatient Visit on 01/16/2025   Component Date Value    POCT Creatinine 01/16/2025 0.52 (L)     POCT eGFR 01/16/2025 >90      The ASCVD Risk score (Daisha ROMAN, et al., 2019) failed to calculate for the following reasons:    The valid total cholesterol range is 130 to 320 mg/dL    Physical Exam  CONSTITUTIONAL - well nourished, well developed, looks like stated age, in no acute distress, not ill-appearing, and not tired appearing  SKIN - normal skin color and pigmentation, normal skin turgor without rash, lesions, or nodules visualized, small skin tone nodule on the left upper back.  HEAD - no trauma, normocephalic  EYES - extraocular muscles are intact, and normal external exam  ENT - TM's intact, no injection, no signs of infection, uvula midline, normal tongue movement and throat normal, no exudate  CHEST - clear to auscultation, no wheezing, no crackles and no rales, good effort  CARDIAC - regular rate and regular rhythm, no skipped beats, no murmur  ABDOMEN - no organomegaly, soft, nontender, nondistended, normal bowel sounds, no guarding/rebound/rigidity, negative McBurney sign and negative Ashford sign  EXTREMITIES - no obvious or evident edema, no obvious or evident deformities  NEUROLOGICAL - normal gait, normal balance, normal motor, no ataxia, DTRs equal and symmetrical; alert, oriented and no focal signs  PSYCHIATRIC - alert, pleasant and cordial, age-appropriate  IMMUNOLOGIC - no cervical lymphadenopathy    Assessment and Plan  Problem List Items Addressed This Visit       Hyperlipidemia  Controlled on current medication, refills provided today    Relevant Medications    atorvastatin (Lipitor) 80 mg tablet    Hypertension, essential  Controlled on current medication, refills provided    Relevant Medications    amLODIPine (Norvasc) 10 mg tablet    hydroCHLOROthiazide  (HYDRODiuril) 25 mg tablet    losartan (Cozaar) 100 mg tablet    Other Relevant Orders    Albumin-Creatinine Ratio, Urine Random    Male erectile disorder  Stop Cialis and start Viagra 100 mg as needed    Relevant Medications    sildenafil (Viagra) 100 mg tablet     Other Visit Diagnoses       Encounter for annual wellness exam in Medicare patient    -  Primary  All preventative screenings are up-to-date, declines any further vaccines    Encounter for preventive health examination      A complete physical exam was performed today    Prediabetes        Relevant Orders    Hemoglobin A1C    Prostate cancer screening        Relevant Orders    Prostate Spec.Ag,Screen     Please follow-up in 6 months     Patient Care Team:  Zhane Quinn DO as PCP - General (Family Medicine)

## 2025-03-05 PROCEDURE — RXMED WILLOW AMBULATORY MEDICATION CHARGE

## 2025-03-07 ENCOUNTER — PHARMACY VISIT (OUTPATIENT)
Dept: PHARMACY | Facility: CLINIC | Age: 70
End: 2025-03-07
Payer: COMMERCIAL

## 2025-03-11 LAB
ALBUMIN/CREAT UR: 18 MG/G CREAT
CREAT SERPL-MCNC: 0.82 MG/DL (ref 0.7–1.35)
CREAT UR-MCNC: 79 MG/DL (ref 20–320)
EGFRCR SERPLBLD CKD-EPI 2021: 96 ML/MIN/1.73M2
EST. AVERAGE GLUCOSE BLD GHB EST-MCNC: 114 MG/DL
EST. AVERAGE GLUCOSE BLD GHB EST-SCNC: 6.3 MMOL/L
HBA1C MFR BLD: 5.6 % OF TOTAL HGB
MICROALBUMIN UR-MCNC: 1.4 MG/DL
PSA SERPL-MCNC: 2.82 NG/ML

## 2025-03-25 ENCOUNTER — HOSPITAL ENCOUNTER (OUTPATIENT)
Dept: RADIOLOGY | Facility: HOSPITAL | Age: 70
Discharge: HOME | End: 2025-03-25
Payer: COMMERCIAL

## 2025-03-25 ENCOUNTER — APPOINTMENT (OUTPATIENT)
Dept: RADIOLOGY | Facility: HOSPITAL | Age: 70
End: 2025-03-25
Payer: COMMERCIAL

## 2025-03-25 DIAGNOSIS — R31.0 GROSS HEMATURIA: ICD-10-CM

## 2025-03-25 PROCEDURE — 2550000001 HC RX 255 CONTRASTS: Performed by: STUDENT IN AN ORGANIZED HEALTH CARE EDUCATION/TRAINING PROGRAM

## 2025-03-25 PROCEDURE — 74178 CT ABD&PLV WO CNTR FLWD CNTR: CPT

## 2025-03-25 RX ADMIN — IOHEXOL 75 ML: 350 INJECTION, SOLUTION INTRAVENOUS at 10:22

## 2025-03-27 ENCOUNTER — PREP FOR PROCEDURE (OUTPATIENT)
Dept: UROLOGY | Facility: HOSPITAL | Age: 70
End: 2025-03-27

## 2025-03-27 ENCOUNTER — OFFICE VISIT (OUTPATIENT)
Dept: UROLOGY | Facility: HOSPITAL | Age: 70
End: 2025-03-27
Payer: COMMERCIAL

## 2025-03-27 DIAGNOSIS — N20.1 LEFT URETERAL CALCULUS: Primary | ICD-10-CM

## 2025-03-27 PROCEDURE — 99214 OFFICE O/P EST MOD 30 MIN: CPT | Performed by: STUDENT IN AN ORGANIZED HEALTH CARE EDUCATION/TRAINING PROGRAM

## 2025-03-27 PROCEDURE — 1159F MED LIST DOCD IN RCRD: CPT | Performed by: STUDENT IN AN ORGANIZED HEALTH CARE EDUCATION/TRAINING PROGRAM

## 2025-03-27 PROCEDURE — 1157F ADVNC CARE PLAN IN RCRD: CPT | Performed by: STUDENT IN AN ORGANIZED HEALTH CARE EDUCATION/TRAINING PROGRAM

## 2025-03-27 PROCEDURE — G2211 COMPLEX E/M VISIT ADD ON: HCPCS | Performed by: STUDENT IN AN ORGANIZED HEALTH CARE EDUCATION/TRAINING PROGRAM

## 2025-03-27 RX ORDER — CIPROFLOXACIN 2 MG/ML
400 INJECTION, SOLUTION INTRAVENOUS ONCE
OUTPATIENT
Start: 2025-03-27 | End: 2025-03-27

## 2025-03-27 NOTE — H&P (VIEW-ONLY)
Subjective   Patient ID: Norbert Avalos is a 69 y.o. male    HPI  69 y.o. male who presents fora follow-up visit with  renal cyst. He was referred by Dr. Fonseca following a CT chest 01/2025 that revealed an indeterminate cystic lesion in the in the left renal pelvis. While this may represent high attenuation parapelvic cyst, further evaluation with dedicated renal ultrasound is recommended to exclude left renal lesion. Renal US 01/2025 showed Anechoic fluid echogenicity areas occupying the left renal pelvis with dilated extrarenal pelvis measuring 2 cm. Findings could represent moderate hydroureteronephrosis, or alternatively parapelvic cysts. Advise further assessment by dedicated CT urogram.      The most recent CT urography, conducted on 03/25/2025, revealed:  1. Left proximal ureteric obstructive calculus measuring 0.8 x 1.2 cm  with upstream moderate hydroureteronephrosis.  2. Prostatomegaly with sequelae of chronic outflow obstruction with a  right mid transition zone nodule measuring 1.5 cm likely BPH nodule.  Advise correlation with PSA level.  3. Uncomplicated colonic diverticulosis.  4. Severe LAD calcifications.    The most recent PSA, conducted on 03/10/2025, revealed:  2.82 ng/mL      Review of Systems    All systems were reviewed. Anything negative was noted in the HPI.    Objective   Physical Exam    General: Well developed, well nourished, alert and cooperative, appears in no acute distress   Eyes: Non-injected conjunctiva, sclera clear, no proptosis   Cardiac: Extremities are warm and well perfused. No edema, cyanosis or pallor   Lungs: Breathing is easy, non-labored. Speaking in clear and complete sentences. Normal diaphragmatic movement   MSK: Ambulatory with steady gait, unassisted   Neuro: Alert and oriented to person, place, and time   Psych: Demonstrates good judgment and reason, without hallucinations, abnormal affect or abnormal behaviors   Skin: No obvious lesions, no rashes       No CVA  tenderness bilaterally   No suprapubic pain or discomfort       Past Medical History:   Diagnosis Date    Agatston coronary artery calcium score between 200 and 399 12/13/2023    Aneurysm of ascending aorta without rupture (CMS-HCC) 12/13/2023    3.9 cm    Coronary artery disease involving native coronary artery of native heart without angina pectoris 12/13/2023         Past Surgical History:   Procedure Laterality Date    OTHER SURGICAL HISTORY  06/02/2022    No history of surgery       Assessment/Plan   Renal fluid collection on US left renal pelvis     69 y.o. male who presents for the above condition, We had a very long and extensive discussion with the patient regarding the pathophysiology, differential diagnosis, risk factor, management, natural history, incidence and diagnostic work-up of the condition.  We had a very long and extensive discussion with the patient regarding his condition.  I discussed with him the pathophysiology, differential diagnosis, risk factor, management of ureteral stones.  Explained to him that the stone is most probably still present given his persistent pain and the recent CT.  I gave the patient 3 options of management including observation which I discouraged given his episode of fever, the size of the location of the stone.  Explained that he has less likely chance of spontaneous passage of the stone.  We also discussed ESWL which would be appropriate for the size of the location of stone.  We discussed at length a left ureteroscopy, laser stone fragmentation, left retrograde pyelogram, left double-J stent insertion.  We discussed in detail the risk, benefit, potential complication, adverse events including hematuria, pneumaturia, pain, stent discomfort and pain, fever, chills, infection, urosepsis, I explained to him that most likely he needs a second procedure at the first wound will be probably only a stent placement.  I explained that the second procedure would be the actual  laser stone fragmentation and exchange of his stent.  Patient presents and elect to proceed.     Plan:  - Left URS,, laser stent, RPG        E&M visit today is associated with current or anticipated ongoing medical care services related to a patient's single, serious condition or a complex condition.     3/27/2025    Scribe Attestation  By signing my name below, Narda ALMEIDA, Yonny attest that this documentation has been prepared under the direction and in the presence of Dr. Gary Loaiza.

## 2025-03-31 ENCOUNTER — CLINICAL SUPPORT (OUTPATIENT)
Dept: PREADMISSION TESTING | Facility: HOSPITAL | Age: 70
End: 2025-03-31
Payer: COMMERCIAL

## 2025-03-31 DIAGNOSIS — N20.1 LEFT URETERAL CALCULUS: ICD-10-CM

## 2025-03-31 RX ORDER — ASPIRIN 81 MG/1
81 TABLET ORAL DAILY
COMMUNITY

## 2025-03-31 RX ORDER — OMEGA-3-ACID ETHYL ESTERS 1 G/1
1 CAPSULE, LIQUID FILLED ORAL DAILY
COMMUNITY

## 2025-03-31 RX ORDER — IBUPROFEN 200 MG
400 TABLET ORAL EVERY 6 HOURS PRN
COMMUNITY

## 2025-03-31 RX ORDER — DEXTROMETHORPHAN HYDROBROMIDE, GUAIFENESIN 5; 100 MG/5ML; MG/5ML
650 LIQUID ORAL EVERY 8 HOURS PRN
COMMUNITY

## 2025-03-31 NOTE — CPM/PAT NURSE NOTE
CPM/PAT Nurse Note      Name: Norbert Avalos (Norbert Avalos)  /Age: 1955/69 y.o.       Past Medical History:   Diagnosis Date    Agatston coronary artery calcium score between 200 and 399 2023    CAC= 349 23    Aneurysm of ascending aorta without rupture 2023    3.9 cm, echo 24, CTA chest 25    Coronary artery disease involving native coronary artery of native heart without angina pectoris 2023    asymptomatic, follows with Dr. Fonseca    ED (erectile dysfunction)     History of echocardiogram 2024    History of stress test 2023    HLD (hyperlipidemia)     HTN (hypertension)     Left ureteral calculus     Renal cyst        Past Surgical History:   Procedure Laterality Date    BREAST FIBROADENOMA SURGERY      age 16    COLONOSCOPY         Patient Sexual activity questions deferred to the physician.    Family History   Problem Relation Name Age of Onset    COPD Mother      Other (hld) Father      Breast cancer Sister          in remission    No Known Problems Sister          x4    Other (hld) Brother      No Known Problems Brother          x7       No Known Allergies    Prior to Admission medications    Medication Sig Start Date End Date Taking? Authorizing Provider   acetaminophen (Tylenol 8 HOUR) 650 mg ER tablet Take 1 tablet (650 mg) by mouth every 8 hours if needed for mild pain (1 - 3). Do not crush, chew, or split.    Historical Provider, MD   alirocumab (Praluent Pen) 150 mg/mL pen injector Inject 1 mL (150 mg) under the skin every 14 (fourteen) days. 24   Crow Fonseca MD   amLODIPine (Norvasc) 10 mg tablet Take 1 tablet (10 mg) by mouth once daily. 3/4/25   Zhane Quinn DO   aspirin 81 mg EC tablet Take 1 tablet (81 mg) by mouth once daily.  Patient not taking: Reported on 3/31/2025    Historical Provider, MD   atorvastatin (Lipitor) 80 mg tablet Take 1 tablet (80 mg) by mouth once daily. 3/4/25   Zhane Quinn DO   GARLIC ORAL  Take 2,400 mg by mouth once daily.    Historical Provider, MD   hydroCHLOROthiazide (HYDRODiuril) 25 mg tablet Take 1 tablet (25 mg) by mouth once daily. 3/4/25   Zhane Quinn DO   ibuprofen 200 mg tablet Take 2 tablets (400 mg) by mouth every 6 hours if needed for mild pain (1 - 3).    Historical Provider, MD   losartan (Cozaar) 100 mg tablet Take 1 tablet (100 mg) by mouth once daily. 3/4/25 8/31/25  Zhane Quinn DO   omega-3 acid ethyl esters (Lovaza) 1 gram capsule Take 1 capsule (1 g) by mouth once daily.    Historical Provider, MD   sildenafil (Viagra) 100 mg tablet Take 1 tablet (100 mg) by mouth once daily as needed for erectile dysfunction. 3/4/25 3/4/26  Zhane Quinn DO   TURMERIC ORAL Take 750 mg by mouth once daily.    Historical Provider, MD KASH NÚÑEZ     DASI Risk Score    No data to display       Caprini DVT Assessment    No data to display       Modified Frailty Index    No data to display       [id number] Stroke Risk Points  Current as of just now        N/A 0 to 9 Points:      Last Change: N/A          The [id number]-[id number] risk score (Lip GH, et al. 2009. © 2010 American College [employer] Chest [employer]icians) quantifies the risk of stroke for a patient with atrial fibrillation. For patients without atrial fibrillation or under the age of 18 this score appears as N/A. Higher score values generally indicate higher risk of stroke.        This score is not applicable to this patient. Components are not calculated.          Revised Cardiac Risk Index    No data to display       Apfel Simplified Score    No data to display       Risk Analysis Index Results This Encounter    No data found in the last 10 encounters.       Prodigy: High Risk  Total Score: 16              Prodigy Age Score      Prodigy Gender Score          ARISCAT Score for Postoperative Pulmonary Complications    No data to display       Oliver Perioperative Risk for Myocardial Infarction or Cardiac Arrest (JOANN)    No data to  display         Nurse Plan of Action:     RN screening call complete.  Reviewed allergies, medications and pharmacy, medical, surgical and social history with patient.  Chart updated.

## 2025-04-01 ENCOUNTER — LAB (OUTPATIENT)
Dept: LAB | Facility: HOSPITAL | Age: 70
End: 2025-04-01
Payer: COMMERCIAL

## 2025-04-01 ENCOUNTER — PRE-ADMISSION TESTING (OUTPATIENT)
Dept: PREADMISSION TESTING | Facility: HOSPITAL | Age: 70
End: 2025-04-01
Payer: COMMERCIAL

## 2025-04-01 VITALS
BODY MASS INDEX: 29.03 KG/M2 | TEMPERATURE: 97.9 F | HEART RATE: 86 BPM | OXYGEN SATURATION: 95 % | RESPIRATION RATE: 14 BRPM | WEIGHT: 184.97 LBS | DIASTOLIC BLOOD PRESSURE: 78 MMHG | SYSTOLIC BLOOD PRESSURE: 131 MMHG | HEIGHT: 67 IN

## 2025-04-01 DIAGNOSIS — R30.0 DYSURIA: ICD-10-CM

## 2025-04-01 DIAGNOSIS — I10 HYPERTENSION, ESSENTIAL: Primary | ICD-10-CM

## 2025-04-01 DIAGNOSIS — I10 ESSENTIAL (PRIMARY) HYPERTENSION: Primary | ICD-10-CM

## 2025-04-01 LAB
ANION GAP SERPL CALC-SCNC: 12 MMOL/L (ref 10–20)
APPEARANCE UR: CLEAR
BASOPHILS # BLD AUTO: 0.09 X10*3/UL (ref 0–0.1)
BASOPHILS NFR BLD AUTO: 1.1 %
BILIRUB UR STRIP.AUTO-MCNC: NEGATIVE MG/DL
BUN SERPL-MCNC: 20 MG/DL (ref 6–23)
CALCIUM SERPL-MCNC: 10.3 MG/DL (ref 8.6–10.3)
CHLORIDE SERPL-SCNC: 105 MMOL/L (ref 98–107)
CO2 SERPL-SCNC: 25 MMOL/L (ref 21–32)
COLOR UR: ABNORMAL
CREAT SERPL-MCNC: 0.96 MG/DL (ref 0.5–1.3)
EGFRCR SERPLBLD CKD-EPI 2021: 86 ML/MIN/1.73M*2
EOSINOPHIL # BLD AUTO: 0.14 X10*3/UL (ref 0–0.7)
EOSINOPHIL NFR BLD AUTO: 1.7 %
ERYTHROCYTE [DISTWIDTH] IN BLOOD BY AUTOMATED COUNT: 13.2 % (ref 11.5–14.5)
GLUCOSE SERPL-MCNC: 90 MG/DL (ref 74–99)
GLUCOSE UR STRIP.AUTO-MCNC: NORMAL MG/DL
HCT VFR BLD AUTO: 40.7 % (ref 41–52)
HGB BLD-MCNC: 13.7 G/DL (ref 13.5–17.5)
IMM GRANULOCYTES # BLD AUTO: 0.02 X10*3/UL (ref 0–0.7)
IMM GRANULOCYTES NFR BLD AUTO: 0.2 % (ref 0–0.9)
KETONES UR STRIP.AUTO-MCNC: NEGATIVE MG/DL
LEUKOCYTE ESTERASE UR QL STRIP.AUTO: ABNORMAL
LYMPHOCYTES # BLD AUTO: 2.24 X10*3/UL (ref 1.2–4.8)
LYMPHOCYTES NFR BLD AUTO: 27.3 %
MCH RBC QN AUTO: 28.8 PG (ref 26–34)
MCHC RBC AUTO-ENTMCNC: 33.7 G/DL (ref 32–36)
MCV RBC AUTO: 86 FL (ref 80–100)
MONOCYTES # BLD AUTO: 0.65 X10*3/UL (ref 0.1–1)
MONOCYTES NFR BLD AUTO: 7.9 %
MUCOUS THREADS #/AREA URNS AUTO: ABNORMAL /LPF
NEUTROPHILS # BLD AUTO: 5.08 X10*3/UL (ref 1.2–7.7)
NEUTROPHILS NFR BLD AUTO: 61.8 %
NITRITE UR QL STRIP.AUTO: NEGATIVE
NRBC BLD-RTO: 0 /100 WBCS (ref 0–0)
PH UR STRIP.AUTO: 6.5 [PH]
PLATELET # BLD AUTO: 304 X10*3/UL (ref 150–450)
POTASSIUM SERPL-SCNC: 3.6 MMOL/L (ref 3.5–5.3)
PROT UR STRIP.AUTO-MCNC: ABNORMAL MG/DL
RBC # BLD AUTO: 4.75 X10*6/UL (ref 4.5–5.9)
RBC # UR STRIP.AUTO: NEGATIVE MG/DL
RBC #/AREA URNS AUTO: ABNORMAL /HPF
SODIUM SERPL-SCNC: 138 MMOL/L (ref 136–145)
SP GR UR STRIP.AUTO: 1.02
UROBILINOGEN UR STRIP.AUTO-MCNC: NORMAL MG/DL
WBC # BLD AUTO: 8.2 X10*3/UL (ref 4.4–11.3)
WBC #/AREA URNS AUTO: ABNORMAL /HPF

## 2025-04-01 PROCEDURE — 81001 URINALYSIS AUTO W/SCOPE: CPT

## 2025-04-01 PROCEDURE — 93005 ELECTROCARDIOGRAM TRACING: CPT | Performed by: NURSE PRACTITIONER

## 2025-04-01 PROCEDURE — 87086 URINE CULTURE/COLONY COUNT: CPT

## 2025-04-01 PROCEDURE — 85025 COMPLETE CBC W/AUTO DIFF WBC: CPT

## 2025-04-01 PROCEDURE — 99204 OFFICE O/P NEW MOD 45 MIN: CPT | Performed by: NURSE PRACTITIONER

## 2025-04-01 PROCEDURE — 80048 BASIC METABOLIC PNL TOTAL CA: CPT

## 2025-04-01 PROCEDURE — 93010 ELECTROCARDIOGRAM REPORT: CPT | Performed by: INTERNAL MEDICINE

## 2025-04-01 ASSESSMENT — ENCOUNTER SYMPTOMS
NEUROLOGICAL NEGATIVE: 1
CONSTITUTIONAL NEGATIVE: 1
CARDIOVASCULAR NEGATIVE: 1
RESPIRATORY NEGATIVE: 1
ENDOCRINE NEGATIVE: 1
GASTROINTESTINAL NEGATIVE: 1
MUSCULOSKELETAL NEGATIVE: 1
NECK NEGATIVE: 1

## 2025-04-01 NOTE — PREPROCEDURE INSTRUCTIONS
Medication List            Accurate as of April 1, 2025  2:37 PM. Always use your most recent med list.                acetaminophen 650 mg ER tablet  Commonly known as: Tylenol 8 HOUR  Medication Adjustments for Surgery: Take/Use as prescribed     amLODIPine 10 mg tablet  Commonly known as: Norvasc  Take 1 tablet (10 mg) by mouth once daily.  Medication Adjustments for Surgery: Take/Use as prescribed     aspirin 81 mg EC tablet  Medication Adjustments for Surgery: Take/Use as prescribed     atorvastatin 80 mg tablet  Commonly known as: Lipitor  Take 1 tablet (80 mg) by mouth once daily.  Medication Adjustments for Surgery: Take/Use as prescribed     GARLIC ORAL  Additional Medication Adjustments for Surgery: Other (Comment)  Notes to patient: Stop today 4/1/25     hydroCHLOROthiazide 25 mg tablet  Commonly known as: HYDRODiuril  Take 1 tablet (25 mg) by mouth once daily.  Medication Adjustments for Surgery: Take/Use as prescribed     ibuprofen 200 mg tablet  Additional Medication Adjustments for Surgery: Other (Comment)  Notes to patient: Stop today 4/1/25     losartan 100 mg tablet  Commonly known as: Cozaar  Take 1 tablet (100 mg) by mouth once daily.  Medication Adjustments for Surgery: Take last dose 1 day (24 hours) before surgery  Notes to patient: Do NOT take evening before surgery and do NOT take morning of surgery.     omega-3 acid ethyl esters 1 gram capsule  Commonly known as: Lovaza  Additional Medication Adjustments for Surgery: Other (Comment)  Notes to patient: Stop today 4/1/25     Praluent Pen 150 mg/mL pen injector  Generic drug: alirocumab  Inject 1 mL (150 mg) under the skin every 14 (fourteen) days.  Medication Adjustments for Surgery: Do Not take on the morning of surgery     sildenafil 100 mg tablet  Commonly known as: Viagra  Take 1 tablet (100 mg) by mouth once daily as needed for erectile dysfunction.  Medication Adjustments for Surgery: Take last dose 3 days before surgery  Notes to  patient: Stop today 4/1/25     TURMERIC ORAL  Additional Medication Adjustments for Surgery: Other (Comment)  Notes to patient: Stop today 4/1/25                Preoperative Brain Exercises    What are brain exercises?  A brain exercise is any activity that engages your thinking (cognitive) skills.    What types of activities are considered brain exercises?  Jigsaw puzzles, crossword puzzles, word jumble, memory games, word search, and many more.  Many can be found free online or on your phone via a mobile fitz.    Why should I do brain exercises before my surgery?  More recent research has shown brain exercise before surgery can lower the risk of postoperative delirium (confusion) which can be especially important for older adults.  Patients who did brain exercises for 5 to 10 hours (total) for the 7-10 days before surgery, cut their risk of postoperative delirium in half up to 1 week after surgery.        Preoperative Deep Breathing Exercises  Why it is important to do deep breathing exercises before my surgery?  Deep breathing exercises strengthen your breathing muscles.  This helps you to recover after your surgery and decreases the chance of breathing complications.  How are the deep breathing exercises done?  Sit straight with your back supported.  Breathe in deeply and slowly through your nose. Your lower rib cage should expand and your abdomen may move forward.  Hold that breath for 3 to 5 seconds.  Breathe out through pursed lips, slowly and completely.  Rest and repeat 10 times every hour while awake.  Rest longer if you become dizzy or lightheaded.        CONTACT SURGEON'S OFFICE IF YOU DEVELOP:  * Fever = 100.4 F   * New respiratory symptoms (e.g. cough, shortness of breath, respiratory distress, sore throat)  * Recent loss of taste or smell  *Flu like symptoms such as headache, fatigue or gastrointestinal symptoms  * You develop any open sores, shingles, burning or painful urination   AND/OR:  * You no  longer wish to have the surgery.  * Any other personal circumstances change that may lead to the need to cancel or defer this surgery.  *You were admitted to any hospital within one week of your planned procedure.    SMOKING:  *Quitting smoking can make a huge difference to your health and recovery from surgery.    *If you need help with quitting, call 0-103-QUIT-NOW.    THE DAY OF SURGERY:  *Do not eat any food after midnight the night before your surgery.   *YOU MUST drink 14 OUNCES of clear liquids TWO hours before your instructed ARRIVAL TIME to the hospital. This includes water, black tea/coffee (no milk or cream), apple juice, clear broth and electrolyte drinks (Gatorade).  Please avoid clear liquids that are red in color.   *You may chew gum/mints up to TWO hours before your surgery/procedure.    SURGICAL TIME:  *You will be contacted between 2 p.m. and 6 p.m. the business day before your surgery with your arrival time.  *If you haven't received a call by 6pm, call [phone].  *Scheduled surgery times may change and you will be notified if this occurs-check your personal voicemail for any updates.    ON THE MORNING OF SURGERY:  *Wear comfortable, loose fitting clothing.   *Do not use moisturizers, creams, lotions or perfume.  *All jewelry and valuables should be left at home.  *Prosthetic devices such as contact lenses, hearing aids, dentures, eyelash extensions, hairpins and body piercing must be removed before surgery.    BRING WITH YOU:  *Photo ID and insurance card  *Current list of medications and allergies  *Pacemaker/Defibrillator/Heart stent cards  *CPAP machine and mask  *Slings/splints/crutches  *Copy of your complete Advanced Directive/DHPOA-if applicable  *Neurostimulator implant remote    PARKING AND ARRIVAL:  *Check in at the Main Entrance desk and let them know you are here for surgery.  *You will be directed to the 2nd floor surgical waiting area.    IF YOU ARE HAVING OUTPATIENT/SAME DAY  SURGERY:  *A responsible adult MUST accompany you at the time of discharge and stay with you for 24 hours after your surgery.  *You may NOT drive yourself home after surgery.  *You may use a taxi or ride sharing service ([place], Uber) to return home ONLY if you are accompanied by a friend or family member.  *Instructions for resuming your medications will be provided by your surgeon.

## 2025-04-01 NOTE — CPM/PAT H&P
[facility]/PAT Evaluation       Name: Norbert Avalos (Norbert Avalos)  /Age: 1955/69 y.o.     In-Person         Date of Consult: 25    Referring Provider:  Dr. Augustin Loaiza    Date, Surgery, and Length:  25, cystoscopy, left ureteroscopy, holmium laser lithotripsy, left retrograde pyelogram, left ureteral stent insertion, 90 minutes    Patient presents to [facility] for perioperative risk assessment prior to scheduled surgery. Patient presents with left obstructive ureteral calculus.      This note was created in part upon personal review of patient's medical records.      Pt denies any past history of anesthetic complications such as PONV, awareness, prolonged sedation, dental damage, aspiration, cardiac arrest, difficult intubation, difficult I.V. access or unexpected hospital admissions. No history of malignant hyperthermia and or pseudocholinesterase deficiency.    No history of blood transfusions.    The patient IS NOT a Mu-ism and will accept blood and blood products if medically indicated.     Type and screen NOT sent.      Past Medical History:   Diagnosis Date    Agatston coronary artery calcium score between 200 and 399 2023    CAC= 349 23    Aneurysm of ascending aorta without rupture 2023    3.9 cm, echo 24, CTA chest 25    Coronary artery disease involving native coronary artery of native heart without angina pectoris 2023    asymptomatic, follows with Dr. Fonseca    ED (erectile dysfunction)     History of echocardiogram 2024    History of stress test 2023    HLD (hyperlipidemia)     HTN (hypertension)     Left ureteral calculus     Renal cyst        Past Surgical History:   Procedure Laterality Date    BREAST FIBROADENOMA SURGERY      age 16    COLONOSCOPY  2020       Family History   Problem Relation Name Age of Onset    COPD Mother      Other (hld) Father      Breast cancer Sister          in remission    No Known Problems Sister       "    x4    Other (hld) Brother      No Known Problems Brother          x7     Social History     Tobacco Use   Smoking Status Never   Smokeless Tobacco Never     Social History     Substance and Sexual Activity   Alcohol Use Yes    Alcohol/week: 1.0 - 2.0 standard drink of alcohol    Types: 1 - 2 Glasses of wine per week     Social History     Substance and Sexual Activity   Drug Use Not Currently       No Known Allergies    Current Outpatient Medications   Medication Instructions    acetaminophen (TYLENOL 8 HOUR) 650 mg, Every 8 hours PRN    amLODIPine (NORVASC) 10 mg, oral, Daily    aspirin 81 mg, Daily    atorvastatin (LIPITOR) 80 mg, oral, Daily    GARLIC ORAL 2,400 mg, Daily    hydroCHLOROthiazide (HYDRODIURIL) 25 mg, oral, Daily    ibuprofen 400 mg, Every 6 hours PRN    losartan (COZAAR) 100 mg, oral, Daily    omega-3 acid ethyl esters (LOVAZA) 1 g, Daily    Praluent Pen 150 mg, subcutaneous, Every 14 days    sildenafil (VIAGRA) 100 mg, oral, Daily PRN    TURMERIC ORAL 750 mg, Daily       PAT ROS:   Constitutional:   neg    Neuro/Psych:   neg    Eyes:    use of corrective lenses  Ears:   neg    Nose:   neg    Mouth:   neg    Throat:   neg    Neck:   neg    Cardio:   neg    Respiratory:   neg    Endocrine:   neg    GI:   neg    :   neg    Musculoskeletal:   neg    Hematologic:   neg    Skin:  neg        Physical Exam  Vitals reviewed. Physical exam within normal limits.          PAT AIRWAY:   Airway:     Mallampati::  II    Neck ROM::  Full  normal          Visit Vitals  /78   Pulse 86   Temp 36.6 °C (97.9 °F) (Tympanic)   Resp 14   Ht 1.71 m (5' 7.32\")   Wt 83.9 kg (184 lb 15.5 oz)   SpO2 95%   BMI 28.69 kg/m²   Smoking Status Never   BSA 2 m²       Assessment and Plan:     Patient is a 69 year old male scheduled for cystoscopy, left ureteroscopy, holmium laser lithotripsy, left retrograde pyelogram, left ureteral stent insertion with Dr. Loaiza on 4/4/25.    Patient is at acceptable risk to proceed " "with planned surgical procedure. Further cardiac risk stratification deferred at this time.This patient is LOW-INTERMEDIATE risk candidate undergoing LOW-MODERATE risk procedure, patient is medically optimized for surgery.    Plan    Neuro:  No neurologic diagnosis, however, the patient is at increased risk for perioperative delirium secondary to  age, polypharmacy, renal insufficiency  Patient is at increased risk for perioperative CVA secondary to  HTN, increased age    Cardiovascular:    RCRI: 0 Risk of Mace: 3.9%      Patient denies any chest pain, tightness, heaviness, pressure, radiating pain, palpitations, irregular heartbeats, lightheadedness, cough, congestion, shortness of breath, PALOMINO, PND, near syncope, weight loss or gain.    Good functional capacity  Functional 4 Mets. Patient denies SOB walking up 2 flights of stairs      EKG in [facility]   Encounter Date: 04/01/25   ECG 12 Lead   Result Value    Ventricular Rate 79    Atrial Rate 79    ND Interval 152    QRS Duration 134    QT Interval 408    QTC Calculation(Bazett) 467    P Axis 29    R Axis -8    T Axis -2    QRS Count 13    Q Onset 224    P Onset 148    P Offset 196    T Offset 428    QTC Fredericia 447    Narrative    Normal sinus rhythm  Right bundle branch block  Abnormal ECG  When compared with ECG of 23-JUL-2008 14:11,  Right bundle branch block is now Present  Confirmed by Ludwig Hernandez ([id number]) on 4/2/2025 9:12:05 AM     HTN-will continue Amlodipine and hydrochlorothiazide , Losartan to be held 24 hours prior to surgery    HLD- continue statin    Follows with cardiologist Dr. Crow Fonseca last seen 12/2024 per note:    \"2. Hypertension, essential  The patient's blood pressure has been well-controlled at today's appointment or by recent primary care provider's measurements/home measurements and meets their goal blood pressure per the ACC/AHA guidelines.  The patient has been compliant with their anti-hypertensive medications and is following a low " "sodium/DASH diet. I advised continuation of their present medical treatment and lifestyle modification.                  - Follow Up In Cardiology     3. Coronary artery disease involving native coronary artery of native heart without angina pectoris  The patient's CAD, as detailed in the HPI, has been clinically stable, without any anginal symptoms or dyspnea.  The patient will continue treatment with guideline-directed medical therapy with antiplatelet (ASA) and statin medications and was advised regular exercise and a heart healthy diet.    '  - Follow Up In Cardiology       5. Aneurysm of ascending aorta without rupture (CMS-HCC)  3.9 cm ascending aorta by CAC scan and normal today by Echo. CTA chest with contrast.  - Follow Up In Cardiology\"      Pulmonary:  No pulmonary diagnosis, however patient is at increased risk of perioperative complications secondary to  age > 60, types of anesthetic  Stop Bang score is 4 placing patient at moderate risk for KALIA  ARISCAT: >45 points, 42.1% risk of in-hospital postoperative pulmonary complication  PRODIGY: High risk for opioid induced respiratory depression    Renal/endo:  Recommendations to avoid nephrotoxic drugs and carefully monitor fluid status to maintain euvolemia. Use dose adjusted medications as needed for the underlying level of renal function.      GI/:    Pending surgery for obstructing stone    Heme:  Patient instructed to ambulate as soon as possible postoperatively to decrease thromboembolic risk.    Initiate mechanical DVT prophylaxis as soon as possible and initiate chemical prophylaxis when deemed safe from a bleeding standpoint post surgery.    Caprini= 3      Risk assessment complete.  Patient is scheduled for a LOW-INTERMEDIATE surgical risk procedure.  He IS considered medically optimized for the planned procedure.      Labs/testing obtained in PAT on 4/1/25: CBC, BMP, UA FLEX, EKG    Lab Results   Component Value Date    WBC 8.2 04/01/2025    HGB " 13.7 04/01/2025    HCT 40.7 (L) 04/01/2025    MCV 86 04/01/2025     04/01/2025     Lab Results   Component Value Date    GLUCOSE 90 04/01/2025    CALCIUM 10.3 04/01/2025     04/01/2025    K 3.6 04/01/2025    CO2 25 04/01/2025     04/01/2025    BUN 20 04/01/2025    CREATININE 0.96 04/01/2025       Follow up/communication: none      Preoperative medication instructions were provided and reviewed with the patient.  Any additional testing or evaluation was explained to the patient.  Nothing by mouth instructions were discussed and patient's questions were answered prior to conclusion to this encounter.  Patient verbalized understanding of preoperative instructions given in preadmission testing; discharge instructions available in EMR.    This note was dictated with speech recognition.  Minor errors may have been detected during use of speech recognition.

## 2025-04-01 NOTE — CPM/PAT NURSE NOTE
CPM/PAT Nurse Note      Name: Norbert Avalos (Norbert Avalos)  /Age: 1955/69 y.o.       Past Medical History:   Diagnosis Date    Agatston coronary artery calcium score between 200 and 399 2023    CAC= 349 23    Aneurysm of ascending aorta without rupture 2023    3.9 cm, echo 24, CTA chest 25    Coronary artery disease involving native coronary artery of native heart without angina pectoris 2023    asymptomatic, follows with Dr. Fonseca    ED (erectile dysfunction)     History of echocardiogram 2024    History of stress test 2023    HLD (hyperlipidemia)     HTN (hypertension)     Left ureteral calculus     Renal cyst        Past Surgical History:   Procedure Laterality Date    BREAST FIBROADENOMA SURGERY      age 16    COLONOSCOPY         Patient Sexual activity questions deferred to the physician.    Family History   Problem Relation Name Age of Onset    COPD Mother      Other (hld) Father      Breast cancer Sister          in remission    No Known Problems Sister          x4    Other (hld) Brother      No Known Problems Brother          x7       No Known Allergies    Prior to Admission medications    Medication Sig Start Date End Date Taking? Authorizing Provider   alirocumab (Praluent Pen) 150 mg/mL pen injector Inject 1 mL (150 mg) under the skin every 14 (fourteen) days. 24  Yes Crow Fonseca MD   amLODIPine (Norvasc) 10 mg tablet Take 1 tablet (10 mg) by mouth once daily. 3/4/25  Yes Zhane Quinn DO   aspirin 81 mg EC tablet Take 1 tablet (81 mg) by mouth once daily.   Yes Historical Provider, MD   atorvastatin (Lipitor) 80 mg tablet Take 1 tablet (80 mg) by mouth once daily. 3/4/25  Yes Zhane Quinn DO   GARLIC ORAL Take 2,400 mg by mouth once daily.   Yes Historical Provider, MD   hydroCHLOROthiazide (HYDRODiuril) 25 mg tablet Take 1 tablet (25 mg) by mouth once daily. 3/4/25  Yes Zhane Quinn DO   losartan (Cozaar) 100 mg  tablet Take 1 tablet (100 mg) by mouth once daily. 3/4/25 8/31/25 Yes Zhane Quinn DO   omega-3 acid ethyl esters (Lovaza) 1 gram capsule Take 1 capsule (1 g) by mouth once daily.   Yes Historical Provider, MD   TURMERIC ORAL Take 750 mg by mouth once daily.   Yes Historical Provider, MD   acetaminophen (Tylenol 8 HOUR) 650 mg ER tablet Take 1 tablet (650 mg) by mouth every 8 hours if needed for mild pain (1 - 3). Do not crush, chew, or split.  Patient not taking: Reported on 4/1/2025    Historical Provider, MD   ibuprofen 200 mg tablet Take 2 tablets (400 mg) by mouth every 6 hours if needed for mild pain (1 - 3).  Patient not taking: Reported on 4/1/2025    Historical Provider, MD   sildenafil (Viagra) 100 mg tablet Take 1 tablet (100 mg) by mouth once daily as needed for erectile dysfunction. 3/4/25 3/4/26  Zhane Quinn DO        PAT ROS     DASI Risk Score    No data to display       Caprini DVT Assessment    No data to display       Modified Frailty Index    No data to display       [id number] Stroke Risk Points  Current as of just now        N/A 0 to 9 Points:      Last Change: N/A          The [id number] risk score (Lip GH, et al. 2009. © 2010 American College [employer] Chest Physicians) quantifies the risk of stroke for a patient with atrial fibrillation. For patients without atrial fibrillation or under the age of 18 this score appears as N/A. Higher score values generally indicate higher risk of stroke.        This score is not applicable to this patient. Components are not calculated.          Revised Cardiac Risk Index    No data to display       Apfel Simplified Score    No data to display       Risk Analysis Index Results This Encounter    No data found in the last 10 encounters.       Prodigy: High Risk  Total Score: 16              Prodigy Age Score      Prodigy Gender Score          ARISCAT Score for Postoperative Pulmonary Complications    No data to display       Oliver Perioperative  Risk for Myocardial Infarction or Cardiac Arrest (JOANN)    No data to display         Nurse Plan of Action:   After Visit Summary (AVS) reviewed and patient verbalized good understanding of medications and NPO instructions.

## 2025-04-02 ENCOUNTER — APPOINTMENT (OUTPATIENT)
Dept: PREADMISSION TESTING | Facility: HOSPITAL | Age: 70
End: 2025-04-02
Payer: COMMERCIAL

## 2025-04-02 LAB
ATRIAL RATE: 79 BPM
BACTERIA UR CULT: NO GROWTH
HOLD SPECIMEN: NORMAL
P AXIS: 29 DEGREES
P OFFSET: 196 MS
P ONSET: 148 MS
PR INTERVAL: 152 MS
Q ONSET: 224 MS
QRS COUNT: 13 BEATS
QRS DURATION: 134 MS
QT INTERVAL: 408 MS
QTC CALCULATION(BAZETT): 467 MS
QTC FREDERICIA: 447 MS
R AXIS: -8 DEGREES
T AXIS: -2 DEGREES
T OFFSET: 428 MS
VENTRICULAR RATE: 79 BPM

## 2025-04-04 ENCOUNTER — ANESTHESIA EVENT (OUTPATIENT)
Dept: OPERATING ROOM | Facility: HOSPITAL | Age: 70
End: 2025-04-04
Payer: COMMERCIAL

## 2025-04-04 ENCOUNTER — ANESTHESIA (OUTPATIENT)
Dept: OPERATING ROOM | Facility: HOSPITAL | Age: 70
End: 2025-04-04
Payer: COMMERCIAL

## 2025-04-04 ENCOUNTER — HOSPITAL ENCOUNTER (OUTPATIENT)
Facility: HOSPITAL | Age: 70
Setting detail: OUTPATIENT SURGERY
Discharge: HOME | End: 2025-04-04
Attending: STUDENT IN AN ORGANIZED HEALTH CARE EDUCATION/TRAINING PROGRAM | Admitting: STUDENT IN AN ORGANIZED HEALTH CARE EDUCATION/TRAINING PROGRAM
Payer: COMMERCIAL

## 2025-04-04 ENCOUNTER — PATIENT MESSAGE (OUTPATIENT)
Dept: UROLOGY | Facility: HOSPITAL | Age: 70
End: 2025-04-04

## 2025-04-04 ENCOUNTER — APPOINTMENT (OUTPATIENT)
Dept: RADIOLOGY | Facility: HOSPITAL | Age: 70
End: 2025-04-04
Payer: COMMERCIAL

## 2025-04-04 VITALS
RESPIRATION RATE: 16 BRPM | SYSTOLIC BLOOD PRESSURE: 137 MMHG | OXYGEN SATURATION: 97 % | BODY MASS INDEX: 26.54 KG/M2 | HEART RATE: 78 BPM | DIASTOLIC BLOOD PRESSURE: 84 MMHG | WEIGHT: 185.41 LBS | HEIGHT: 70 IN | TEMPERATURE: 97.7 F

## 2025-04-04 DIAGNOSIS — N20.1 LEFT URETERAL CALCULUS: Primary | ICD-10-CM

## 2025-04-04 PROCEDURE — 76000 FLUOROSCOPY <1 HR PHYS/QHP: CPT | Mod: LT

## 2025-04-04 PROCEDURE — 3600000003 HC OR TIME - INITIAL BASE CHARGE - PROCEDURE LEVEL THREE: Performed by: STUDENT IN AN ORGANIZED HEALTH CARE EDUCATION/TRAINING PROGRAM

## 2025-04-04 PROCEDURE — 7100000001 HC RECOVERY ROOM TIME - INITIAL BASE CHARGE: Performed by: STUDENT IN AN ORGANIZED HEALTH CARE EDUCATION/TRAINING PROGRAM

## 2025-04-04 PROCEDURE — 7100000009 HC PHASE TWO TIME - INITIAL BASE CHARGE: Performed by: STUDENT IN AN ORGANIZED HEALTH CARE EDUCATION/TRAINING PROGRAM

## 2025-04-04 PROCEDURE — 2500000004 HC RX 250 GENERAL PHARMACY W/ HCPCS (ALT 636 FOR OP/ED): Performed by: STUDENT IN AN ORGANIZED HEALTH CARE EDUCATION/TRAINING PROGRAM

## 2025-04-04 PROCEDURE — 2500000004 HC RX 250 GENERAL PHARMACY W/ HCPCS (ALT 636 FOR OP/ED): Performed by: ANESTHESIOLOGIST ASSISTANT

## 2025-04-04 PROCEDURE — 3700000002 HC GENERAL ANESTHESIA TIME - EACH INCREMENTAL 1 MINUTE: Performed by: STUDENT IN AN ORGANIZED HEALTH CARE EDUCATION/TRAINING PROGRAM

## 2025-04-04 PROCEDURE — 3600000008 HC OR TIME - EACH INCREMENTAL 1 MINUTE - PROCEDURE LEVEL THREE: Performed by: STUDENT IN AN ORGANIZED HEALTH CARE EDUCATION/TRAINING PROGRAM

## 2025-04-04 PROCEDURE — 74420 UROGRAPHY RTRGR +-KUB: CPT | Performed by: STUDENT IN AN ORGANIZED HEALTH CARE EDUCATION/TRAINING PROGRAM

## 2025-04-04 PROCEDURE — 52351 CYSTOURETERO & OR PYELOSCOPE: CPT | Performed by: STUDENT IN AN ORGANIZED HEALTH CARE EDUCATION/TRAINING PROGRAM

## 2025-04-04 PROCEDURE — 7100000002 HC RECOVERY ROOM TIME - EACH INCREMENTAL 1 MINUTE: Performed by: STUDENT IN AN ORGANIZED HEALTH CARE EDUCATION/TRAINING PROGRAM

## 2025-04-04 PROCEDURE — 3700000001 HC GENERAL ANESTHESIA TIME - INITIAL BASE CHARGE: Performed by: STUDENT IN AN ORGANIZED HEALTH CARE EDUCATION/TRAINING PROGRAM

## 2025-04-04 PROCEDURE — C1769 GUIDE WIRE: HCPCS | Performed by: STUDENT IN AN ORGANIZED HEALTH CARE EDUCATION/TRAINING PROGRAM

## 2025-04-04 PROCEDURE — 7100000010 HC PHASE TWO TIME - EACH INCREMENTAL 1 MINUTE: Performed by: STUDENT IN AN ORGANIZED HEALTH CARE EDUCATION/TRAINING PROGRAM

## 2025-04-04 PROCEDURE — 2720000007 HC OR 272 NO HCPCS: Performed by: STUDENT IN AN ORGANIZED HEALTH CARE EDUCATION/TRAINING PROGRAM

## 2025-04-04 RX ORDER — SODIUM CHLORIDE, SODIUM LACTATE, POTASSIUM CHLORIDE, CALCIUM CHLORIDE 600; 310; 30; 20 MG/100ML; MG/100ML; MG/100ML; MG/100ML
100 INJECTION, SOLUTION INTRAVENOUS CONTINUOUS
Status: DISCONTINUED | OUTPATIENT
Start: 2025-04-04 | End: 2025-04-04 | Stop reason: HOSPADM

## 2025-04-04 RX ORDER — PROPOFOL 10 MG/ML
INJECTION, EMULSION INTRAVENOUS AS NEEDED
Status: DISCONTINUED | OUTPATIENT
Start: 2025-04-04 | End: 2025-04-04

## 2025-04-04 RX ORDER — DROPERIDOL 2.5 MG/ML
0.62 INJECTION, SOLUTION INTRAMUSCULAR; INTRAVENOUS ONCE AS NEEDED
Status: DISCONTINUED | OUTPATIENT
Start: 2025-04-04 | End: 2025-04-04 | Stop reason: HOSPADM

## 2025-04-04 RX ORDER — CIPROFLOXACIN 500 MG/1
500 TABLET ORAL 2 TIMES DAILY
Qty: 6 TABLET | Refills: 0 | Status: SHIPPED | OUTPATIENT
Start: 2025-04-04 | End: 2025-04-07

## 2025-04-04 RX ORDER — HYDRALAZINE HYDROCHLORIDE 20 MG/ML
5 INJECTION INTRAMUSCULAR; INTRAVENOUS EVERY 30 MIN PRN
Status: DISCONTINUED | OUTPATIENT
Start: 2025-04-04 | End: 2025-04-04 | Stop reason: HOSPADM

## 2025-04-04 RX ORDER — FENTANYL CITRATE 50 UG/ML
INJECTION, SOLUTION INTRAMUSCULAR; INTRAVENOUS AS NEEDED
Status: DISCONTINUED | OUTPATIENT
Start: 2025-04-04 | End: 2025-04-04

## 2025-04-04 RX ORDER — OXYCODONE HYDROCHLORIDE 5 MG/1
5 TABLET ORAL
Status: DISCONTINUED | OUTPATIENT
Start: 2025-04-04 | End: 2025-04-04 | Stop reason: HOSPADM

## 2025-04-04 RX ORDER — LIDOCAINE HYDROCHLORIDE 20 MG/ML
INJECTION, SOLUTION INFILTRATION; PERINEURAL AS NEEDED
Status: DISCONTINUED | OUTPATIENT
Start: 2025-04-04 | End: 2025-04-04

## 2025-04-04 RX ORDER — PROCHLORPERAZINE EDISYLATE 5 MG/ML
5 INJECTION INTRAMUSCULAR; INTRAVENOUS ONCE AS NEEDED
Status: DISCONTINUED | OUTPATIENT
Start: 2025-04-04 | End: 2025-04-04 | Stop reason: HOSPADM

## 2025-04-04 RX ORDER — OXYBUTYNIN CHLORIDE 5 MG/1
10 TABLET, EXTENDED RELEASE ORAL DAILY PRN
Qty: 15 TABLET | Refills: 1 | Status: SHIPPED | OUTPATIENT
Start: 2025-04-04 | End: 2025-04-19

## 2025-04-04 RX ORDER — ONDANSETRON HYDROCHLORIDE 2 MG/ML
INJECTION, SOLUTION INTRAVENOUS AS NEEDED
Status: DISCONTINUED | OUTPATIENT
Start: 2025-04-04 | End: 2025-04-04

## 2025-04-04 RX ORDER — MIDAZOLAM HYDROCHLORIDE 1 MG/ML
INJECTION INTRAMUSCULAR; INTRAVENOUS AS NEEDED
Status: DISCONTINUED | OUTPATIENT
Start: 2025-04-04 | End: 2025-04-04

## 2025-04-04 RX ORDER — PHENAZOPYRIDINE HYDROCHLORIDE 200 MG/1
200 TABLET, FILM COATED ORAL 3 TIMES DAILY
Qty: 15 TABLET | Refills: 0 | Status: SHIPPED | OUTPATIENT
Start: 2025-04-04 | End: 2025-04-09

## 2025-04-04 RX ORDER — ONDANSETRON HYDROCHLORIDE 2 MG/ML
4 INJECTION, SOLUTION INTRAVENOUS ONCE AS NEEDED
Status: DISCONTINUED | OUTPATIENT
Start: 2025-04-04 | End: 2025-04-04 | Stop reason: HOSPADM

## 2025-04-04 RX ORDER — CIPROFLOXACIN 2 MG/ML
400 INJECTION, SOLUTION INTRAVENOUS ONCE
Status: COMPLETED | OUTPATIENT
Start: 2025-04-04 | End: 2025-04-04

## 2025-04-04 RX ORDER — PHENYLEPHRINE HCL IN 0.9% NACL 1 MG/10 ML
SYRINGE (ML) INTRAVENOUS AS NEEDED
Status: DISCONTINUED | OUTPATIENT
Start: 2025-04-04 | End: 2025-04-04

## 2025-04-04 RX ADMIN — LIDOCAINE HYDROCHLORIDE 100 MG: 20 INJECTION, SOLUTION INFILTRATION; PERINEURAL at 16:45

## 2025-04-04 RX ADMIN — CIPROFLOXACIN 400 MG: 400 INJECTION, SOLUTION INTRAVENOUS at 16:47

## 2025-04-04 RX ADMIN — MIDAZOLAM HYDROCHLORIDE 2 MG: 1 INJECTION, SOLUTION INTRAMUSCULAR; INTRAVENOUS at 14:30

## 2025-04-04 RX ADMIN — FENTANYL CITRATE 50 MCG: 50 INJECTION, SOLUTION INTRAMUSCULAR; INTRAVENOUS at 16:46

## 2025-04-04 RX ADMIN — PROPOFOL 50 MG: 10 INJECTION, EMULSION INTRAVENOUS at 16:46

## 2025-04-04 RX ADMIN — Medication 100 MCG: at 17:05

## 2025-04-04 RX ADMIN — MIDAZOLAM HYDROCHLORIDE 2 MG: 1 INJECTION, SOLUTION INTRAMUSCULAR; INTRAVENOUS at 16:36

## 2025-04-04 RX ADMIN — MIDAZOLAM HYDROCHLORIDE 2 MG: 1 INJECTION, SOLUTION INTRAMUSCULAR; INTRAVENOUS at 16:38

## 2025-04-04 RX ADMIN — ONDANSETRON 4 MG: 2 INJECTION, SOLUTION INTRAMUSCULAR; INTRAVENOUS at 16:58

## 2025-04-04 RX ADMIN — DEXAMETHASONE SODIUM PHOSPHATE 8 MG: 4 INJECTION, SOLUTION INTRAMUSCULAR; INTRAVENOUS at 16:57

## 2025-04-04 RX ADMIN — FENTANYL CITRATE 50 MCG: 50 INJECTION, SOLUTION INTRAMUSCULAR; INTRAVENOUS at 16:45

## 2025-04-04 RX ADMIN — PROPOFOL 150 MG: 10 INJECTION, EMULSION INTRAVENOUS at 16:45

## 2025-04-04 RX ADMIN — SODIUM CHLORIDE, POTASSIUM CHLORIDE, SODIUM LACTATE AND CALCIUM CHLORIDE: 600; 310; 30; 20 INJECTION, SOLUTION INTRAVENOUS at 16:39

## 2025-04-04 SDOH — HEALTH STABILITY: MENTAL HEALTH: CURRENT SMOKER: 0

## 2025-04-04 ASSESSMENT — COLUMBIA-SUICIDE SEVERITY RATING SCALE - C-SSRS
1. IN THE PAST MONTH, HAVE YOU WISHED YOU WERE DEAD OR WISHED YOU COULD GO TO SLEEP AND NOT WAKE UP?: NO
6. HAVE YOU EVER DONE ANYTHING, STARTED TO DO ANYTHING, OR PREPARED TO DO ANYTHING TO END YOUR LIFE?: NO
2. HAVE YOU ACTUALLY HAD ANY THOUGHTS OF KILLING YOURSELF?: NO

## 2025-04-04 ASSESSMENT — PAIN - FUNCTIONAL ASSESSMENT
PAIN_FUNCTIONAL_ASSESSMENT: 0-10
PAIN_FUNCTIONAL_ASSESSMENT: UNABLE TO SELF-REPORT
PAIN_FUNCTIONAL_ASSESSMENT: 0-10

## 2025-04-04 ASSESSMENT — PAIN SCALES - GENERAL
PAINLEVEL_OUTOF10: 0 - NO PAIN

## 2025-04-04 NOTE — ANESTHESIA PREPROCEDURE EVALUATION
Patient: Norbert Avalos    Procedure Information       Date/Time: 04/04/25 1410    Procedure: Cystoscopy; Left Ureteroscopy; Holmium Laser Lithotripsy; Left Retrograde Pyelogram; Left Ureteral Stent Insertion (Left: Ureter)    Location: [facility] OR 05 / [facility] OR    Surgeons: Gary Loaiza MD MPH                                                         Pre-Anesthesia Evaluation                                         Norbert Avalos is a 69 y.o. male who presents for the above mentioned procedure due to Left ureteral calculus [N20.1]    Past Medical History:   Diagnosis Date    Agatston coronary artery calcium score between 200 and 399 12/13/2023    CAC= 349 8/24/23    Aneurysm of ascending aorta without rupture 12/13/2023    3.9 cm, echo 12/16/24, CTA chest 1/16/25    Coronary artery disease involving native coronary artery of native heart without angina pectoris 12/13/2023    asymptomatic, follows with Dr. Fonseca    ED (erectile dysfunction)     History of echocardiogram 12/16/2024    History of stress test 12/13/2023    HLD (hyperlipidemia)     HTN (hypertension)     Left ureteral calculus     Renal cyst      Past Surgical History:   Procedure Laterality Date    BREAST FIBROADENOMA SURGERY Bilateral     age 16    COLONOSCOPY  2020    WISDOM TOOTH EXTRACTION       Social History   He reports that he has never smoked. He has never used smokeless tobacco. He reports current alcohol use of about 1.0 - 2.0 standard drink of alcohol per week. He reports that he does not currently use drugs.    Allergies and Medications   No Known Allergies       Current Outpatient Medications   Medication Instructions    acetaminophen (TYLENOL 8 HOUR) 650 mg, Every 8 hours PRN    amLODIPine (NORVASC) 10 mg, oral, Daily    aspirin 81 mg, Daily    atorvastatin (LIPITOR) 80 mg, oral, Daily    ciprofloxacin (CIPRO) 500 mg, oral, 2 times daily    GARLIC ORAL 2,400 mg, Daily    hydroCHLOROthiazide (HYDRODIURIL) 25 mg, oral, Daily     ibuprofen 400 mg, Every 6 hours PRN    losartan (COZAAR) 100 mg, oral, Daily    omega-3 acid ethyl esters (LOVAZA) 1 g, Daily    oxybutynin XL (DITROPAN-XL) 10 mg, oral, Daily PRN, Do not crush, chew, or split.    phenazopyridine (PYRIDIUM) 200 mg, oral, 3 times daily    Praluent Pen 150 mg, subcutaneous, Every 14 days    sildenafil (VIAGRA) 100 mg, oral, Daily PRN    TURMERIC ORAL 750 mg, Daily     Recent Labs     04/01/25  1511   WBC 8.2   HGB 13.7   HCT 40.7*      MCV 86     Recent Labs     04/01/25  1511 03/10/25  0901 01/16/25  0934 12/16/24  0846   EGFR 86 96   < > >90   ANIONGAP 12  --   --  12   BUN 20  --   --  15   CREATININE 0.96 0.82  --  0.88     --   --  140   K 3.6  --   --  3.7     --   --  105   CO2 25  --   --  27   GLUCOSE 90  --   --  103*   CALCIUM 10.3  --   --  9.6    < > = values in this interval not displayed.     Recent Labs     03/10/25  0859 06/21/23  0919 06/02/22  1000   HGBA1C 5.6   < >  --    TSH  --   --  1.75    < > = values in this interval not displayed.     Recent Labs     12/16/24  0846 03/21/24  1306   BILITOT 0.7 0.7   PROT 6.9 7.3   ALBUMIN 4.2 4.5   ALKPHOS 52 57   ALT 14 25   AST 14 19     EKG   Encounter Date: 04/01/25   ECG 12 Lead   Result Value    Ventricular Rate 79    Atrial Rate 79    PA Interval 152    QRS Duration 134    QT Interval 408    QTC Calculation(Bazett) 467    P Axis 29    R Axis -8    T Axis -2    QRS Count 13    Q Onset 224    P Onset 148    P Offset 196    T Offset 428    QTC Fredericia 447    Narrative    Normal sinus rhythm  Right bundle branch block  Abnormal ECG  When compared with ECG of 23-JUL-2008 14:11,  Right bundle branch block is now Present  Confirmed by Ludwig Hernandez ([id number]) on 4/2/2025 9:12:05 AM     Ejection Fractions:  LV EF   Date/Time Value Ref Range Status   12/16/2024 08:40 AM 70 %    12/13/2023 08:48 AM 73       CT cardiac scoring wo IV contrast 08/24/2023    1. Coronary artery calcium score of 349.43*.    Echo  12/16/24   1. The left ventricular systolic function is normal, with a Kaufman's biplane calculated ejection fraction of 70%.   2. There is severely increased septal thickness.   3. There is moderate concentric left ventricular hypertrophy.   4. There is normal right ventricular global systolic function.   5. Aortic valve sclerosis.   6. Mild aortic valve regurgitation.      Visit Vitals  Smoking Status Never            4/1/2025     2:26 PM 3/4/2025     2:42 PM 12/16/2024     8:29 AM 5/9/2024     2:29 PM 1/10/2024     9:14 AM 12/13/2023     7:41 AM 12/13/2023     7:39 AM   BP REVIEW   BP (ultimate) 131/78 130/80 161/86 136/80 140/82 159/99 159/99            Relevant Problems   Cardiac   (+) Aneurysm of ascending aorta without rupture   (+) Coronary artery disease involving native coronary artery of native heart without angina pectoris   (+) Hyperlipidemia   (+) Hypertension, essential       Clinical information reviewed:   Tobacco  Allergies  Meds   Med Hx  Surg Hx   Fam Hx  Soc Hx        NPO Detail:  NPO/Void Status  Carbohydrate Drink Given Prior to Surgery? : N  Date of Last Liquid: 04/04/25  Time of Last Liquid: 1100  Date of Last Solid: 04/03/25  Time of Last Solid: 2130  Last Intake Type: Clear fluids  Time of Last Void: 1335         Physical Exam    Airway  Mallampati: II  TM distance: >3 FB  Neck ROM: full     Cardiovascular   Rhythm: regular  Rate: normal     Dental - normal exam     Pulmonary   Comments: Normal RR  Non-labored respiration    Abdominal            Anesthesia Plan    History of general anesthesia?: yes  History of complications of general anesthesia?: no    ASA 3     general   (LMA with standard ASA monitoring.)  The patient is not a current smoker.    intravenous induction   Anesthetic plan and risks discussed with patient.    Plan discussed with CRNA and CAA.

## 2025-04-04 NOTE — OP NOTE
Cystoscopy; Left Ureteroscopy; Holmium Laser Lithotripsy; Left Retrograde Pyelogram; Left Ureteral Stent Insertion (L) Operative Note     Date: 2025  OR Location: [facility]    Name: Norbert Avalos, : 1955, Age: 69 y.o., MRN: [id number], Sex: male    Diagnosis  Pre-op Diagnosis      * Left ureteral calculus [N20.1] Post-op Diagnosis     * Left ureteral calculus [N20.1]     Procedures  Cystoscopy; Left Ureteroscopy; Holmium Laser Lithotripsy; Left Retrograde Pyelogram; Left Ureteral Stent Insertion  09439 - WI CYSTO/URETERO W/LITHOTRIPSY &INDWELL STENT INSRT    Cystoscopy; Left Ureteroscopy; Holmium Laser Lithotripsy; Left Retrograde Pyelogram; Left Ureteral Stent Insertion  88807 - CHG UROGRAPHY RETROGRADE WITH/WO KUB      Surgeons      * Gary Loaiza - Primary    Resident/Fellow/Other Assistant:  Surgeons and Role:  * No surgeons found with a matching role *    Staff:   Circulator:   Scrub Person:     Anesthesia Staff: Anesthesiologist: Ivett Narvaez MD  C-AA: PATSY Gamble    Procedure Summary  Anesthesia: Anesthesia type not filed in the log.  ASA: ASA status not filed in the log.  Estimated Blood Loss: 5mL  Intra-op Medications: Administrations occurring from 1410 to 1540 on 25:  * No intraprocedure medications in log *        Specimen: No specimens collected         Indications: Norbert Avalos is an 69 y.o. male who is having surgery for Left ureteral calculus [N20.1].     The patient was seen in the preoperative area. The risks, benefits, complications, treatment options, non-operative alternatives, expected recovery and outcomes were discussed with the patient. The possibilities of reaction to medication, pulmonary aspiration, injury to surrounding structures, bleeding, recurrent infection, the need for additional procedures, failure to diagnose a condition, and creating a complication requiring transfusion or operation were discussed with the patient. The patient concurred with the  proposed plan, giving informed consent.  The site of surgery was properly noted/marked if necessary per policy. The patient has been actively warmed in preoperative area. Preoperative antibiotics have been ordered and given within 1 hours of incision. Venous thrombosis prophylaxis have been ordered including bilateral sequential compression devices    Procedure Details:     Patient consented to procedure in preoperative area. Risks and benefits discussed. Patient was marked on the left side. Allergies were reviewed and preoperative antibiotics were administered. Patient was brought to the operating room and placed in supine position on the operating room table. A timeout was performed. All were in agreement. Patient underwent general anesthesia without complication. They were repositioned in dorsal lithotomy and prepped and draped in the usual sterile fashion. A 21 fr rigid cystoscope was used to perform cystourethroscopy. Urethra was normal. UOs were in normal orthotopic position. No masses or stones were identified.  Through the left UO, a sensor wire was placed through a 5Fr open enderd ureteral catheter. Retrograde pyelogram was performed.     Retrograde pyelogram interpretation: Ureter had no hydroureteronephrosis. There was a major Filling defect in the proximal ureter where contrast could not by pass the stone, and did not pass into the kidney. The stone was completely obliterating the lumen of his left ureter.     We tried to insert a wire. The wire could not bypass the stone. We tried different manual maneuver, different types of lipophilic wires, however, no wire could bypass the stone, and it would coil at the lower level of the stone.     We then tried placing the wire with vision using a rigid ureteroscope. The ureteroscope was advanced alongside the wire into the left UO. The ureteroscope could not reach the stone. Another trial of wire maneuvering was attempted through the ureteroscope, however, the  wire could not bypass the impacted stone.    Decision was taken to abort. The patient will need a left nephrostomy.      Patient was awoken from anesthesia without complication and transferred to PACU in stable condition.      HE will fu with me next week in office     Complications:  None; patient tolerated the procedure well.    Disposition: PACU - hemodynamically stable.  Condition: stable     Attending Attestation:     Gary Loaiza  Phone Number: [phone]

## 2025-04-04 NOTE — INTERVAL H&P NOTE
H&P reviewed. The patient was examined and there are no changes to the H&P.    Today We had a very long and extensive discussion with the patient regarding his condition.  I discussed with him the pathophysiology, differential diagnosis, risk factor, management of ureteral stones.  Explained to him that the stone is most probably still present given his persistent pain and the recent CT.  I gave the patient 3 options of management including observation which I discouraged given his episode of fever, the size of the location of the stone.  Explained that he has less likely chance of spontaneous passage of the stone.  We also discussed ESWL which would be appropriate for the size of the location of stone.  We discussed at length a left ureteroscopy, laser stone fragmentation, left retrograde pyelogram, left double-J stent insertion.  We discussed in detail the risk, benefit, potential complication, adverse events including hematuria, pneumaturia, pain, stent discomfort and pain, fever, chills, infection, urosepsis, I explained to him that most likely he needs a second procedure at the first would will be only a stent placement given the size and location of his stone. We clearly discussed that he most definitely need a second surgical procedure in case I was able to only insert a DJ stent today.   I explained that the second procedure would be the actual laser stone fragmentation and exchange of his stent.  Patient presents clearly understood the 2 stages of the procedure requiring two OR  by a month, and elect to proceed.     Plan:  - Left DJ stent today, with possible URS, laser lithotripsy, RPG

## 2025-04-04 NOTE — PERIOPERATIVE NURSING NOTE
1732- PHASE I - Patient to PACU bay at this time in stable condition. Bedside monitors applied to patient upon arrival to PACU. Report received from OR Team at bedside.     1737- Called patient's girlfriend, Sulema, at this time; no answer; left VM updates.     1745- Patient tolerating sips of water at this time. Denies nausea     1747- Messaged Dr. Loaiza asking if he is able to come to bedside to speak with patient.     1751- Dr. Loaiza at bedside speaking with patient at this time    1804- Home going instructions and Rx went over with patient and patient's family member. Educated on when to follow up with provider. All questions answered and patient and patient's family member verified understanding verbally.    1806- IV removed at this time with IV catheter tip intact upon removal    1814- Patient transported to [facility] at this time in stable condition and with all belongings via wheelchair.

## 2025-04-04 NOTE — ANESTHESIA PROCEDURE NOTES
Airway  Date/Time: 4/4/2025 4:46 PM  Urgency: elective    Airway not difficult    Staffing  Performed: CRNA   Authorized by: Darrin Abreu MD    Performed by: PATSY Hernandez  Patient location during procedure: OR    Indications and Patient Condition  Indications for airway management: anesthesia  Spontaneous ventilation: present  Sedation level: minimal  Preoxygenated: yes  Patient position: sniffing  MILS maintained throughout  Mask difficulty assessment: 1 - vent by mask  Planned trial extubation    Final Airway Details  Final airway type: supraglottic airway (IGEL)      Successful airway: Size 5     Number of attempts at approach: 1

## 2025-04-04 NOTE — ANESTHESIA POSTPROCEDURE EVALUATION
Patient: Norbert Avalos    Procedure Summary       Date: 04/04/25 Room / Location: [facility] OR 05 / [facility]    Anesthesia Start: 1639 Anesthesia Stop: 1737    Procedure: Cystoscopy; Left Ureteroscopy; Holmium Laser Lithotripsy; Left Retrograde Pyelogram; (Left: Ureter) Diagnosis:       Left ureteral calculus      (Left ureteral calculus [N20.1])    Surgeons: Gary Loaiza MD MPH Responsible Provider: Darrin Abreu MD    Anesthesia Type: general ASA Status: 3            Anesthesia Type: general    Vitals Value Taken Time   /84 04/04/25 1805   Temp 36.5 °C (97.7 °F) 04/04/25 1805   Pulse 78 04/04/25 1805   Resp 16 04/04/25 1805   SpO2 98 % 04/04/25 1805       Anesthesia Post Evaluation    Patient location during evaluation: PACU  Patient participation: complete - patient participated  Level of consciousness: awake  Pain management: adequate  Airway patency: patent  Cardiovascular status: acceptable  Respiratory status: acceptable  Hydration status: acceptable  Postoperative Nausea and Vomiting: none    No notable events documented.

## 2025-04-05 ENCOUNTER — PATIENT MESSAGE (OUTPATIENT)
Dept: UROLOGY | Facility: HOSPITAL | Age: 70
End: 2025-04-05
Payer: COMMERCIAL

## 2025-04-07 DIAGNOSIS — N20.1 LEFT URETERAL CALCULUS: ICD-10-CM

## 2025-04-08 ENCOUNTER — APPOINTMENT (OUTPATIENT)
Dept: UROLOGY | Facility: HOSPITAL | Age: 70
End: 2025-04-08
Payer: COMMERCIAL

## 2025-04-08 NOTE — PROGRESS NOTES
Subjective   Patient ID: Norbert Avalos is a 69 y.o. male    [facility]  69 y.o. male who presents fora follow-up visit with  renal cyst. S/P Left URS, laser stent, RPG 04/04/2025. He was referred by Dr. Fonseca following a CT chest 01/2025 that revealed an indeterminate cystic lesion in the in the left renal pelvis. While this may represent high attenuation parapelvic cyst, further evaluation with dedicated renal ultrasound is recommended to exclude left renal lesion. Renal US 01/2025 showed Anechoic fluid echogenicity areas occupying the left renal pelvis with dilated extrarenal pelvis measuring 2 cm. Findings could represent moderate hydroureteronephrosis, or alternatively parapelvic cysts. Advise further assessment by dedicated CT urogram.    Today, he ***        Review of Systems    All systems were reviewed. Anything negative was noted in the HPI.    Objective   Physical Exam    General: Well developed, well nourished, alert and cooperative, appears in no acute distress   Eyes: Non-injected conjunctiva, sclera clear, no proptosis   Cardiac: Extremities are warm and well perfused. No edema, cyanosis or pallor   Lungs: Breathing is easy, non-labored. Speaking in clear and complete sentences. Normal diaphragmatic movement   MSK: Ambulatory with steady gait, unassisted   Neuro: Alert and oriented to person, place, and time   Psych: Demonstrates good judgment and reason, without hallucinations, abnormal affect or abnormal behaviors   Skin: No obvious lesions, no rashes       No CVA tenderness bilaterally   No suprapubic pain or discomfort       Past Medical History:   Diagnosis Date    Agatston coronary artery calcium score between 200 and 399 12/13/2023    CAC= 349 8/24/23    Aneurysm of ascending aorta without rupture 12/13/2023    3.9 cm, echo 12/16/24, CTA chest 1/16/25    Coronary artery disease involving native coronary artery of native heart without angina pectoris 12/13/2023    asymptomatic, follows with Dr. Fonseca     ED (erectile dysfunction)     History of echocardiogram 12/16/2024    History of stress test 12/13/2023    HLD (hyperlipidemia)     HTN (hypertension)     Left ureteral calculus     Renal cyst          Past Surgical History:   Procedure Laterality Date    BREAST FIBROADENOMA SURGERY Bilateral     age 16    COLONOSCOPY  2020    WISDOM TOOTH EXTRACTION         Assessment/Plan   Renal fluid collection on US left renal pelvis     69 y.o. male who presents for the above condition, We had a very long and extensive discussion with the patient regarding the pathophysiology, differential diagnosis, risk factor, management, natural history, incidence and diagnostic work-up of the condition.  We had a very long and extensive discussion with the patient regarding his condition.  I discussed with him the pathophysiology, differential diagnosis, risk factor, management of ureteral stones.  Explained to him that the stone is most probably still present given his persistent pain and the recent CT.  I gave the patient 3 options of management including observation which I discouraged given his episode of fever, the size of the location of the stone.  Explained that he has less likely chance of spontaneous passage of the stone.  We also discussed ESWL which would be appropriate for the size of the location of stone.  We discussed at length a left ureteroscopy, laser stone fragmentation, left retrograde pyelogram, left double-J stent insertion.  We discussed in detail the risk, benefit, potential complication, adverse events including hematuria, pneumaturia, pain, stent discomfort and pain, fever, chills, infection, urosepsis, I explained to him that most likely he needs a second procedure at the first wound will be probably only a stent placement.  I explained that the second procedure would be the actual laser stone fragmentation and exchange of his stent.  Patient presents and elect to proceed.     Plan:  - ***          E&M visit  today is associated with current or anticipated ongoing medical care services related to a patient's single, serious condition or a complex condition.     4/10/2025    Scribe Attestation  By signing my name below, I, ***, Scribe attest that this documentation has been prepared under the direction and in the presence of Dr. Gary Loaiza.

## 2025-04-10 ENCOUNTER — OFFICE VISIT (OUTPATIENT)
Dept: UROLOGY | Facility: HOSPITAL | Age: 70
End: 2025-04-10
Payer: COMMERCIAL

## 2025-04-10 DIAGNOSIS — N20.1 LEFT URETERAL CALCULUS: Primary | ICD-10-CM

## 2025-04-10 PROCEDURE — 1157F ADVNC CARE PLAN IN RCRD: CPT | Performed by: STUDENT IN AN ORGANIZED HEALTH CARE EDUCATION/TRAINING PROGRAM

## 2025-04-10 PROCEDURE — 1159F MED LIST DOCD IN RCRD: CPT | Performed by: STUDENT IN AN ORGANIZED HEALTH CARE EDUCATION/TRAINING PROGRAM

## 2025-04-10 PROCEDURE — 99211 OFF/OP EST MAY X REQ PHY/QHP: CPT | Performed by: STUDENT IN AN ORGANIZED HEALTH CARE EDUCATION/TRAINING PROGRAM

## 2025-04-10 NOTE — PROGRESS NOTES
Subjective   Patient ID: Norbert Avalos is a 69 y.o. male    HPI  69 y.o. male who presents fora follow-up visit with renal cyst. S/P Left URS, laser stent, RPG 04/04/2025. He was referred by Dr. Fonseca following a CT chest 01/2025 that revealed an indeterminate cystic lesion in the in the left renal pelvis. While this may represent high attenuation parapelvic cyst, further evaluation with dedicated renal ultrasound is recommended to exclude left renal lesion. Renal US 01/2025 showed Anechoic fluid echogenicity areas occupying the left renal pelvis with dilated extrarenal pelvis measuring 2 cm. Findings could represent moderate hydroureteronephrosis, or alternatively parapelvic cysts. Advise further assessment by dedicated CT urogram.    Today, we discussed at length the  outcome of the surgery.   Explained to him at length that his stone was completely obstructive and that it did not allow me to bypass it or insert a stent.   However, he notes no fever, chills, or pain. His urine color ranged from dark yellow to orange currently.       Review of Systems    All systems were reviewed. Anything negative was noted in the HPI.    Objective   Physical Exam    General: Well developed, well nourished, alert and cooperative, appears in no acute distress   Eyes: Non-injected conjunctiva, sclera clear, no proptosis   Cardiac: Extremities are warm and well perfused. No edema, cyanosis or pallor   Lungs: Breathing is easy, non-labored. Speaking in clear and complete sentences. Normal diaphragmatic movement   MSK: Ambulatory with steady gait, unassisted   Neuro: Alert and oriented to person, place, and time   Psych: Demonstrates good judgment and reason, without hallucinations, abnormal affect or abnormal behaviors   Skin: No obvious lesions, no rashes       No CVA tenderness bilaterally   No suprapubic pain or discomfort       Past Medical History:   Diagnosis Date    Agatston coronary artery calcium score between 200 and 399  12/13/2023    CAC= 349 8/24/23    Aneurysm of ascending aorta without rupture 12/13/2023    3.9 cm, echo 12/16/24, CTA chest 1/16/25    Coronary artery disease involving native coronary artery of native heart without angina pectoris 12/13/2023    asymptomatic, follows with Dr. Fonseca    ED (erectile dysfunction)     History of echocardiogram 12/16/2024    History of stress test 12/13/2023    HLD (hyperlipidemia)     HTN (hypertension)     Left ureteral calculus     Renal cyst          Past Surgical History:   Procedure Laterality Date    BREAST FIBROADENOMA SURGERY Bilateral     age 16    COLONOSCOPY  2020    WISDOM TOOTH EXTRACTION         Assessment/Plan   Aborted surgery for impacted left proximal stone     69 y.o. male who presents for the above condition, We had a very long and extensive discussion with the patient regarding the pathophysiology, differential diagnosis, risk factor, management, natural history, incidence and diagnostic work-up of the condition.  We had a very long and extensive discussion with the patient regarding his condition.  I discussed with him the pathophysiology, differential diagnosis, risk factor, management of ureteral stones.  Explained to him that the stone is most still present given his  recent surgery outcome.  I gave the patient 3 options of management including observation which I discouraged given his the size of the location of the stone.  Explained that he has less likely chance of spontaneous passage of the stone.  We also discussed ESWL which would not be appropriate for the size of the location of stone.  We discussed at length a second left ureteroscopy, laser stone fragmentation, left retrograde pyelogram, left double-J stent insertion VS PCNL.  We discussed in detail the risk, benefit, potential complication, adverse events including hematuria, pneumaturia, pain, stent discomfort and pain, fever, chills, infection, urosepsis, I explained to him that he needs a second  procedure.        Plan:  - Follow up another urologist Dr. Monzon and schedule procedure.   - Follow up as needed.      E&M visit today is associated with current or anticipated ongoing medical care services related to a patient's single, serious condition or a complex condition.     4/10/2025    Scribe Attestation  By signing my name below, IChristiane, Scrflorecitae attest that this documentation has been prepared under the direction and in the presence of Dr. Gary Loaiza.

## 2025-04-23 ENCOUNTER — PATIENT MESSAGE (OUTPATIENT)
Dept: UROLOGY | Facility: HOSPITAL | Age: 70
End: 2025-04-23
Payer: COMMERCIAL

## 2025-05-07 ENCOUNTER — APPOINTMENT (OUTPATIENT)
Dept: UROLOGY | Facility: HOSPITAL | Age: 70
End: 2025-05-07
Payer: COMMERCIAL

## 2025-05-07 NOTE — PROGRESS NOTES
HPI    69 y.o. male being seen with the following problem list:    Problem list:  Obstructing kidney stone     CT Angio/Chest w/wo contrast 01/16/25  IMPRESSION:  1.  The thoracic aorta is normal in course, caliber, and contour. The  thoracic aorta is nonaneurysmal. There is moderate atherosclerotic  plaque of the visualized thoracic aorta.  2. There is moderate to severe atherosclerotic calcifications of the  left coronary artery.  3. There is an indeterminate cystic lesion in the in the left renal  pelvis. While this may represent high attenuation parapelvic cyst,  further evaluation with dedicated renal ultrasound is recommended to  exclude left renal lesion.    Renal US Results 01/21/25  IMPRESSION:  1. Anechoic fluid echogenicity areas occupying the left renal pelvis  with dilated extrarenal pelvis measuring 2 cm. Findings could  represent moderate hydroureteronephrosis, or alternatively parapelvic  cysts. Advise further assessment by dedicated CT urogram.      CT Urogram results 03/25/25  IMPRESSION:  1. Left proximal ureteric obstructive calculus measuring 0.8 x 1.2 cm  with upstream moderate hydroureteronephrosis.  2. Prostatomegaly with sequelae of chronic outflow obstruction with a  right mid transition zone nodule measuring 1.5 cm likely BPH nodule.  Advise correlation with PSA level.  3. Uncomplicated colonic diverticulosis.  4. Severe LAD calcifications.    05/07/25 - ***    Lab Results   Component Value Date    PSA 2.82 03/10/2025         Current Medications:  Current Medications[1]     Active Problems:  Norbert Avalos is a 69 y.o. male with the following Problems and Medications.  Problem List[2]  Current Medications[3]    PMH:  Medical History[4]    PSH:  Surgical History[5]    FMH:  Family History[6]    SHx:  Social History[7]    Allergies:  RX Allergies[8]    Physical Exam:      Assessment/Plan  ***        Scribe Attestation  By signing my name below, IRee Scribe   attest that this  documentation has been prepared under the direction and in the presence of Miguel Rodas MD.       [1]   Current Outpatient Medications   Medication Sig Dispense Refill    acetaminophen (Tylenol 8 HOUR) 650 mg ER tablet Take 1 tablet (650 mg) by mouth every 8 hours if needed for mild pain (1 - 3). Do not crush, chew, or split. (Patient not taking: Reported on 4/1/2025)      alirocumab (Praluent Pen) 150 mg/mL pen injector Inject 1 mL (150 mg) under the skin every 14 (fourteen) days. 6 mL 3    amLODIPine (Norvasc) 10 mg tablet Take 1 tablet (10 mg) by mouth once daily. 90 tablet 1    aspirin 81 mg EC tablet Take 1 tablet (81 mg) by mouth once daily.      atorvastatin (Lipitor) 80 mg tablet Take 1 tablet (80 mg) by mouth once daily. 90 tablet 1    GARLIC ORAL Take 2,400 mg by mouth once daily.      hydroCHLOROthiazide (HYDRODiuril) 25 mg tablet Take 1 tablet (25 mg) by mouth once daily. 90 tablet 1    ibuprofen 200 mg tablet Take 2 tablets (400 mg) by mouth every 6 hours if needed for mild pain (1 - 3).      losartan (Cozaar) 100 mg tablet Take 1 tablet (100 mg) by mouth once daily. 90 tablet 1    omega-3 acid ethyl esters (Lovaza) 1 gram capsule Take 1 capsule (1 g) by mouth once daily.      sildenafil (Viagra) 100 mg tablet Take 1 tablet (100 mg) by mouth once daily as needed for erectile dysfunction. (Patient not taking: Reported on 4/4/2025) 12 tablet 3    TURMERIC ORAL Take 750 mg by mouth once daily.       No current facility-administered medications for this visit.   [2]   Patient Active Problem List  Diagnosis    Hyperlipidemia    Hypertension, essential    Male erectile disorder    BMI 27.0-27.9,adult    Coronary artery disease involving native coronary artery of native heart without angina pectoris    Agatston coronary artery calcium score between 200 and 399    Aneurysm of ascending aorta without rupture    Left ureteral calculus   [3]   Current Outpatient Medications   Medication Sig Dispense Refill     acetaminophen (Tylenol 8 HOUR) 650 mg ER tablet Take 1 tablet (650 mg) by mouth every 8 hours if needed for mild pain (1 - 3). Do not crush, chew, or split. (Patient not taking: Reported on 4/1/2025)      alirocumab (Praluent Pen) 150 mg/mL pen injector Inject 1 mL (150 mg) under the skin every 14 (fourteen) days. 6 mL 3    amLODIPine (Norvasc) 10 mg tablet Take 1 tablet (10 mg) by mouth once daily. 90 tablet 1    aspirin 81 mg EC tablet Take 1 tablet (81 mg) by mouth once daily.      atorvastatin (Lipitor) 80 mg tablet Take 1 tablet (80 mg) by mouth once daily. 90 tablet 1    GARLIC ORAL Take 2,400 mg by mouth once daily.      hydroCHLOROthiazide (HYDRODiuril) 25 mg tablet Take 1 tablet (25 mg) by mouth once daily. 90 tablet 1    ibuprofen 200 mg tablet Take 2 tablets (400 mg) by mouth every 6 hours if needed for mild pain (1 - 3).      losartan (Cozaar) 100 mg tablet Take 1 tablet (100 mg) by mouth once daily. 90 tablet 1    omega-3 acid ethyl esters (Lovaza) 1 gram capsule Take 1 capsule (1 g) by mouth once daily.      sildenafil (Viagra) 100 mg tablet Take 1 tablet (100 mg) by mouth once daily as needed for erectile dysfunction. (Patient not taking: Reported on 4/4/2025) 12 tablet 3    TURMERIC ORAL Take 750 mg by mouth once daily.       No current facility-administered medications for this visit.   [4]   Past Medical History:  Diagnosis Date    Agatston coronary artery calcium score between 200 and 399 12/13/2023    CAC= 349 8/24/23    Aneurysm of ascending aorta without rupture 12/13/2023    3.9 cm, echo 12/16/24, CTA chest 1/16/25    Coronary artery disease involving native coronary artery of native heart without angina pectoris 12/13/2023    asymptomatic, follows with Dr. Fonseca    ED (erectile dysfunction)     History of echocardiogram 12/16/2024    History of stress test 12/13/2023    HLD (hyperlipidemia)     HTN (hypertension)     Left ureteral calculus     Renal cyst    [5]   Past Surgical  History:  Procedure Laterality Date    BREAST FIBROADENOMA SURGERY Bilateral     age 16    COLONOSCOPY  2020    WISDOM TOOTH EXTRACTION     [6]   Family History  Problem Relation Name Age of Onset    COPD Mother      Other (hld) Father      Breast cancer Sister          in remission    No Known Problems Sister          x4    Other (hld) Brother      No Known Problems Brother          x7   [7]   Social History  Tobacco Use    Smoking status: Never    Smokeless tobacco: Never   Vaping Use    Vaping status: Never Used   Substance Use Topics    Alcohol use: Yes     Alcohol/week: 1.0 - 2.0 standard drink of alcohol     Types: 1 - 2 Glasses of wine per week    Drug use: Not Currently   [8] No Known Allergies

## 2025-05-23 PROCEDURE — RXMED WILLOW AMBULATORY MEDICATION CHARGE

## 2025-05-27 ENCOUNTER — PHARMACY VISIT (OUTPATIENT)
Dept: PHARMACY | Facility: CLINIC | Age: 70
End: 2025-05-27
Payer: COMMERCIAL

## 2025-05-28 ENCOUNTER — HOSPITAL ENCOUNTER (OUTPATIENT)
Facility: HOSPITAL | Age: 70
Setting detail: OUTPATIENT SURGERY
End: 2025-05-28
Payer: COMMERCIAL

## 2025-05-28 ENCOUNTER — OFFICE VISIT (OUTPATIENT)
Dept: UROLOGY | Facility: HOSPITAL | Age: 70
End: 2025-05-28
Payer: COMMERCIAL

## 2025-05-28 DIAGNOSIS — N28.89 OTHER SPECIFIED DISORDERS OF KIDNEY AND URETER: ICD-10-CM

## 2025-05-28 DIAGNOSIS — N20.1 LEFT URETERAL CALCULUS: Primary | ICD-10-CM

## 2025-05-28 PROCEDURE — 99214 OFFICE O/P EST MOD 30 MIN: CPT

## 2025-05-28 PROCEDURE — 1159F MED LIST DOCD IN RCRD: CPT

## 2025-05-28 PROCEDURE — 1160F RVW MEDS BY RX/DR IN RCRD: CPT

## 2025-05-28 PROCEDURE — 1036F TOBACCO NON-USER: CPT

## 2025-05-28 PROCEDURE — 99204 OFFICE O/P NEW MOD 45 MIN: CPT

## 2025-05-28 PROCEDURE — G2211 COMPLEX E/M VISIT ADD ON: HCPCS

## 2025-05-28 RX ORDER — CHLORHEXIDINE GLUCONATE 40 MG/ML
SOLUTION TOPICAL DAILY PRN
OUTPATIENT
Start: 2025-05-28

## 2025-05-28 RX ORDER — CEFAZOLIN SODIUM 2 G/100ML
2 INJECTION, SOLUTION INTRAVENOUS ONCE
OUTPATIENT
Start: 2025-05-28 | End: 2025-05-28

## 2025-05-28 NOTE — PROGRESS NOTES
Chief complaint:  Nephrolithiasis  Referring physician:  No ref. provider found     SUBJECTIVE:    Medical history:   has a past medical history of Agatston coronary artery calcium score between 200 and 399 (12/13/2023), Aneurysm of ascending aorta without rupture (12/13/2023), Coronary artery disease involving native coronary artery of native heart without angina pectoris (12/13/2023), ED (erectile dysfunction), History of echocardiogram (12/16/2024), History of stress test (12/13/2023), HLD (hyperlipidemia), HTN (hypertension), Left ureteral calculus, and Renal cyst.   Surgical history:   has a past surgical history that includes Colonoscopy (2020); Breast fibroadenoma surgery (Bilateral); and [place] tooth extraction.  Family history:  family history includes Breast cancer in his sister; COPD in his mother; No Known Problems in his brother and sister; hld in his brother and father.  Social history:   reports that he has never smoked. He has never used smokeless tobacco. He reports current alcohol use of about 1.0 - 2.0 standard drink of alcohol per week. He reports that he does not currently use drugs.    Medications:    Current Outpatient Medications   Medication Instructions    acetaminophen (TYLENOL 8 HOUR) 650 mg, Every 8 hours PRN    amLODIPine (NORVASC) 10 mg, oral, Daily    aspirin 81 mg, Daily    atorvastatin (LIPITOR) 80 mg, oral, Daily    GARLIC ORAL 2,400 mg, Daily    hydroCHLOROthiazide (HYDRODIURIL) 25 mg, oral, Daily    ibuprofen 400 mg, Every 6 hours PRN    losartan (COZAAR) 100 mg, oral, Daily    omega-3 acid ethyl esters (LOVAZA) 1 g, Daily    Praluent Pen 150 mg, subcutaneous, Every 14 days    sildenafil (VIAGRA) 100 mg, oral, Daily PRN    TURMERIC ORAL 750 mg, Daily      Allergies:    RX Allergies[1]     ROS:  14-point review of systems negative except as noted above.      HPI:  Norbert Avalos is a 70 y.o. male with a history of complicated ureteral stone who presents for initial evaluation of  proximal left ureteral stone  During a cardiology follow-up, hydronephrosis (HUN) was detected. Further evaluation with a CT scan revealed a proximal left ureteral stone associated with significant hydronephrosis. On April 4th, an attempt was made to perform ureteroscopy or place a double-J stent; however, due to inflammation and edema of the ureteral walls, the procedure could not be completed. The patient was referred for endourological management. He is currently asymptomatic, with no pain or hematuria--only experiencing hematuria at the onset of the condition.    OBJECTIVE:  There were no vitals taken for this visit.There is no height or weight on file to calculate BMI.    Physical exam  General:  No acute distress  HEENT:  EOMI  CV:  Regular rate  Pulm:  Nonlabored respirations  Abd:  Soft, non-distended  :  No suprapubic or CVA tenderness  MSK:  No contractures  Neuro:  Motor intact  Psych:  Appropriate affect    Labs:    I have personally reviewed your lab tests  Lab Results   Component Value Date    WBC 8.2 04/01/2025    HGB 13.7 04/01/2025    HCT 40.7 (L) 04/01/2025     04/01/2025    ALT 14 12/16/2024    AST 14 12/16/2024     04/01/2025    K 3.6 04/01/2025     04/01/2025    CREATININE 0.96 04/01/2025    BUN 20 04/01/2025    CO2 25 04/01/2025    HGBA1C 5.6 03/10/2025     Urine Culture (no units)   Date Value   04/01/2025 No growth      Lab Results   Component Value Date    PSA 2.82 03/10/2025       Imaging:    I have personally reviewed images    ASSESSMENT:   Norbert Avalos is a 70 y.o. male presenting with  ureteral stone  In summary, the patient has a proximal stone with at least moderate hydronephrosis. The condition likely began several months ago, which may explain the failed attempt to place a double-J stent. Updated imaging is needed. I explained that the appropriate approach is percutaneous, along with the associated risks, including bleeding, injury to adjacent organs,  ureteral injury, and the potential need for a double-J stent or additional procedures.  No symptoms  No previous stent, previous procedure: UPR  stone characteristics: 1 cm proximla left stone, 1140 HU  theurapeutics alternatives: minPCNL  Risk: bleednig, fistulae, large bowel injury, double j stent  PLAN:  Left miniPCNL  First: new CT, labs,and preadmin test, stop garlic oral  Problem List Items Addressed This Visit    None       Follow-up with CT result    Scott Monzon MD    Problem List Items Addressed This Visit    None          [1] No Known Allergies

## 2025-06-27 NOTE — PROGRESS NOTES
Pharmacist Clinic: Cardiology Management    Norbert Avalos is a 70 y.o. male was referred to Clinical Pharmacy Team for cholesterol management.     Referring Provider: Crow Fonseca MD    THIS IS A FOLLOW UP PATIENT APPOINTMENT. AT LAST VISIT ON 01/27/2025 WITH PHARMACIST (Casandra Will).    REVIEW OF LAST APPT  Patient states he is tolerating Praluent and atorvastatin with no adverse effects and reports compliance, injecting medication every 2 weeks. Patient diet and exercise remain unchanged.   Patient recently completed lipid panel, he is at goal for his cholesterol, plan to continue current regimen.    Appointment was completed by Norbert who was reached at primary number.    Allergies Reviewed? No    No Known Allergies    Past Medical History:   Diagnosis Date    Agatston coronary artery calcium score between 200 and 399 12/13/2023    CAC= 349 8/24/23    Aneurysm of ascending aorta without rupture 12/13/2023    3.9 cm, echo 12/16/24, CTA chest 1/16/25    Coronary artery disease involving native coronary artery of native heart without angina pectoris 12/13/2023    asymptomatic, follows with Dr. Fonseca    ED (erectile dysfunction)     History of echocardiogram 12/16/2024    History of stress test 12/13/2023    HLD (hyperlipidemia)     HTN (hypertension)     Left ureteral calculus     Renal cyst        Current Outpatient Medications on File Prior to Visit   Medication Sig Dispense Refill    acetaminophen (Tylenol 8 HOUR) 650 mg ER tablet Take 1 tablet (650 mg) by mouth every 8 hours if needed for mild pain (1 - 3). Do not crush, chew, or split. (Patient not taking: Reported on 4/1/2025)      alirocumab (Praluent Pen) 150 mg/mL pen injector Inject 1 mL (150 mg) under the skin every 14 (fourteen) days. 6 mL 3    amLODIPine (Norvasc) 10 mg tablet Take 1 tablet (10 mg) by mouth once daily. 90 tablet 1    aspirin 81 mg EC tablet Take 1 tablet (81 mg) by mouth once daily.      atorvastatin (Lipitor) 80 mg tablet  "Take 1 tablet (80 mg) by mouth once daily. 90 tablet 1    GARLIC ORAL Take 2,400 mg by mouth once daily.      hydroCHLOROthiazide (HYDRODiuril) 25 mg tablet Take 1 tablet (25 mg) by mouth once daily. 90 tablet 1    ibuprofen 200 mg tablet Take 2 tablets (400 mg) by mouth every 6 hours if needed for mild pain (1 - 3).      losartan (Cozaar) 100 mg tablet Take 1 tablet (100 mg) by mouth once daily. 90 tablet 1    omega-3 acid ethyl esters (Lovaza) 1 gram capsule Take 1 capsule (1 g) by mouth once daily.      sildenafil (Viagra) 100 mg tablet Take 1 tablet (100 mg) by mouth once daily as needed for erectile dysfunction. (Patient not taking: Reported on 4/4/2025) 12 tablet 3    TURMERIC ORAL Take 750 mg by mouth once daily.       No current facility-administered medications on file prior to visit.         RELEVANT LAB RESULTS:  Lab Results   Component Value Date    BILITOT 0.7 12/16/2024    CALCIUM 10.3 04/01/2025    CO2 25 04/01/2025     04/01/2025    CREATININE 0.96 04/01/2025    GLUCOSE 90 04/01/2025    ALKPHOS 52 12/16/2024    K 3.6 04/01/2025    PROT 6.9 12/16/2024     04/01/2025    AST 14 12/16/2024    ALT 14 12/16/2024    BUN 20 04/01/2025    ANIONGAP 12 04/01/2025    ALBUMIN 4.2 12/16/2024    GFRMALE >90 06/21/2023     Lab Results   Component Value Date    TRIG 101 12/16/2024    CHOL 88 12/16/2024    LDLCALC 17 12/16/2024    HDL 50.6 12/16/2024     No results found for: \"BMCBC\", \"CBCDIF\"     PHARMACEUTICAL ASSESSMENT:    MEDICATION RECONCILIATION    Drug Interactions? No    Medication Documentation Review Audit       Reviewed by Scott Monzon MD (Physician) on 05/28/25 at 1339      Medication Order Taking? Sig Documenting Provider Last Dose Status   acetaminophen (Tylenol 8 HOUR) 650 mg ER tablet [id number] No Take 1 tablet (650 mg) by mouth every 8 hours if needed for mild pain (1 - 3). Do not crush, chew, or split.   Patient not taking: Reported on 4/1/2025    Historical Provider, MD More than a " month Active   alirocumab (Praluent Pen) 150 mg/mL pen injector [id number] No Inject 1 mL (150 mg) under the skin every 14 (fourteen) days. Crow Fonseca MD 3/29/2025 Active   amLODIPine (Norvasc) 10 mg tablet [id number] No Take 1 tablet (10 mg) by mouth once daily. Zhane Quinn DO 4/4/2025 Active   aspirin 81 mg EC tablet [id number] No Take 1 tablet (81 mg) by mouth once daily. Jacqueline Addison MD 4/2/2025 Active   atorvastatin (Lipitor) 80 mg tablet [id number] No Take 1 tablet (80 mg) by mouth once daily. Zhane Quinn DO 4/4/2025 Active   GARLIC ORAL [id number] No Take 2,400 mg by mouth once daily. Historical Provider, MD Past Week Active   hydroCHLOROthiazide (HYDRODiuril) 25 mg tablet [id number] No Take 1 tablet (25 mg) by mouth once daily. Zhane Quinn DO 4/4/2025 Active   ibuprofen 200 mg tablet [id number] No Take 2 tablets (400 mg) by mouth every 6 hours if needed for mild pain (1 - 3). Historical Provider, MD Past Month Active   losartan (Cozaar) 100 mg tablet [id number] No Take 1 tablet (100 mg) by mouth once daily. Zhane Quinn DO 4/3/2025 Morning Active   omega-3 acid ethyl esters (Lovaza) 1 gram capsule [id number] No Take 1 capsule (1 g) by mouth once daily. Historical Provider, MD Past Week Active   sildenafil (Viagra) 100 mg tablet [id number] No Take 1 tablet (100 mg) by mouth once daily as needed for erectile dysfunction.   Patient not taking: Reported on 4/4/2025    Zhane Quinn DO More than a month Active   TURMERIC ORAL [id number] No Take 750 mg by mouth once daily. Historical Provider, MD Past Week Active                    DISEASE MANAGEMENT ASSESSMENT:     HYPERCHOLESTEROLEMIA ASSESSMENT    RECENT LIPID PANEL (DATE): 12/16/2024  Lab Results   Component Value Date    TRIG 101 12/16/2024    CHOL 88 12/16/2024    LDLCALC 17 12/16/2024    HDL 50.6 12/16/2024          ASCVD SCORE: The ASCVD Risk score (Daisha ROMAN, et al., 2019) failed to calculate for the following  reasons:    The valid total cholesterol range is 130 to 320 mg/dL  Coronary Heart Disease (MI, angina, coronary artery stenosis): No   History of ischemic stroke? No   History of carotid artery stenosis? No   Peripheral artery disease? No   ASCVD RISK FACTORS:    CKD? No   Diabetes? No   HTN? Yes   Persistently elevated LDL? No   Elevated triglycerides? No   Inflammatory diseases (rheumatoid arthritis, psoriasis, HIV)? No    CURRENT PHARMACOTHERAPY  - Statin? Yes, describe: Atorvastatin 80mg daily  - Ezetimibe? No  - PCSK9-I? Yes, describe: Praluent 150mg every 14 days  - Other lipid lowering agents? No      RELEVANT PAST MEDICAL HISTORY:   HLD, HTN    ASSESSMENT    Affordability/Accessibility:  PAP  Adherence/Organization: Reports adherence, injects medication on Saturday  Adverse Effects: none reported  Recent Hospitalizations: No  Diet:   Breakfast: Coffee  Lunch: yogurt, soup, sandwich, frozen meal (I.e lean cuisine)  Dinner: meat carb, veggie/salad  Exercise: Yes, describe: Rides stationary bike 3-4x week for 30 minutes   Tobacco Use: No  Alcohol Use: Yes, describe: Couple glasses of wine couple times week  Next Lipid Panel: 6 months    EDUCATION/COUNSELING:  - Counseled patient on MOA, expectations, side effects, duration of therapy, contraindications, administration, and monitoring parameters  - Answered all patient questions and concerns     DISCUSSION/NOTES:   Patient states he is tolerating Praluent and atorvastatin with no adverse effects and reports compliance, injecting medication every 2 weeks on Saturdays.   Patient aware at next follow up visit we will be discussing  PAP renewal.       ASSESSMENT:    Assessment/Plan   Problem List Items Addressed This Visit       Hyperlipidemia    Patient currently on atorvastatin 80mg and Praluent 150mg injection. Last lipid panel (12/2024), patient below LDL goal, appropriate to continue current regimen.                Relevant Orders    Referral to Clinical  Pharmacy       RECOMMENDATIONS/PLAN:    CONTINUE  Praluent 150mg injection every 14 days  Atorvastatin 80mg daily    Last Appnt with Referring Provider: 12/16/2024  Next Appnt with Referring Provider: 12/17/2025  Clinical Pharmacist follow up: 3 months  VAF/Application Expiration: 11/08/2025  Type of Encounter: Alexa Will PharmD    Verbal consent to manage patient's drug therapy was obtained from the patient . They were informed they may decline to participate or withdraw from participation in pharmacy services at any time.    Continue all meds under the continuation of care with the referring provider and clinical pharmacy team.

## 2025-06-30 ENCOUNTER — APPOINTMENT (OUTPATIENT)
Dept: PHARMACY | Facility: HOSPITAL | Age: 70
End: 2025-06-30
Payer: COMMERCIAL

## 2025-06-30 DIAGNOSIS — E78.2 MIXED HYPERLIPIDEMIA: ICD-10-CM

## 2025-06-30 NOTE — Clinical Note
Jose Fonseca,  Today I spoke with Norbert about his cholesterol medications. Patient states he is tolerating Praluent and atorvastatin well reporting no adverse effects. Plan to continue current regimen and follow up in 3 months to re-enroll him in  PAP. Thank you!

## 2025-07-02 ENCOUNTER — HOSPITAL ENCOUNTER (OUTPATIENT)
Dept: RADIOLOGY | Facility: HOSPITAL | Age: 70
Discharge: HOME | End: 2025-07-02
Payer: COMMERCIAL

## 2025-07-02 ENCOUNTER — LAB (OUTPATIENT)
Dept: LAB | Facility: HOSPITAL | Age: 70
End: 2025-07-02
Payer: COMMERCIAL

## 2025-07-02 DIAGNOSIS — N20.1 CALCULUS OF URETER: Primary | ICD-10-CM

## 2025-07-02 DIAGNOSIS — N28.89 OTHER SPECIFIED DISORDERS OF KIDNEY AND URETER: ICD-10-CM

## 2025-07-02 DIAGNOSIS — N20.1 LEFT URETERAL CALCULUS: ICD-10-CM

## 2025-07-02 LAB
ANION GAP SERPL CALC-SCNC: 15 MMOL/L (ref 10–20)
APTT PPP: 28 SECONDS (ref 26–36)
BASOPHILS # BLD AUTO: 0.08 X10*3/UL (ref 0–0.1)
BASOPHILS NFR BLD AUTO: 1.1 %
BUN SERPL-MCNC: 17 MG/DL (ref 6–23)
CALCIUM SERPL-MCNC: 9.8 MG/DL (ref 8.6–10.3)
CHLORIDE SERPL-SCNC: 107 MMOL/L (ref 98–107)
CO2 SERPL-SCNC: 23 MMOL/L (ref 21–32)
CREAT SERPL-MCNC: 0.95 MG/DL (ref 0.5–1.3)
EGFRCR SERPLBLD CKD-EPI 2021: 87 ML/MIN/1.73M*2
EOSINOPHIL # BLD AUTO: 0.24 X10*3/UL (ref 0–0.7)
EOSINOPHIL NFR BLD AUTO: 3.4 %
ERYTHROCYTE [DISTWIDTH] IN BLOOD BY AUTOMATED COUNT: 13.3 % (ref 11.5–14.5)
GLUCOSE SERPL-MCNC: 86 MG/DL (ref 74–99)
HCT VFR BLD AUTO: 42 % (ref 41–52)
HGB BLD-MCNC: 13.5 G/DL (ref 13.5–17.5)
IMM GRANULOCYTES # BLD AUTO: 0.02 X10*3/UL (ref 0–0.7)
IMM GRANULOCYTES NFR BLD AUTO: 0.3 % (ref 0–0.9)
INR PPP: 1 (ref 0.9–1.1)
LYMPHOCYTES # BLD AUTO: 2.31 X10*3/UL (ref 1.2–4.8)
LYMPHOCYTES NFR BLD AUTO: 32.6 %
MCH RBC QN AUTO: 28.5 PG (ref 26–34)
MCHC RBC AUTO-ENTMCNC: 32.1 G/DL (ref 32–36)
MCV RBC AUTO: 89 FL (ref 80–100)
MONOCYTES # BLD AUTO: 0.54 X10*3/UL (ref 0.1–1)
MONOCYTES NFR BLD AUTO: 7.6 %
NEUTROPHILS # BLD AUTO: 3.9 X10*3/UL (ref 1.2–7.7)
NEUTROPHILS NFR BLD AUTO: 55 %
NRBC BLD-RTO: 0 /100 WBCS (ref 0–0)
PLATELET # BLD AUTO: 296 X10*3/UL (ref 150–450)
POTASSIUM SERPL-SCNC: 3.8 MMOL/L (ref 3.5–5.3)
PROTHROMBIN TIME: 10.5 SECONDS (ref 9.8–12.4)
RBC # BLD AUTO: 4.73 X10*6/UL (ref 4.5–5.9)
SODIUM SERPL-SCNC: 141 MMOL/L (ref 136–145)
WBC # BLD AUTO: 7.1 X10*3/UL (ref 4.4–11.3)

## 2025-07-02 PROCEDURE — 80048 BASIC METABOLIC PNL TOTAL CA: CPT

## 2025-07-02 PROCEDURE — 74176 CT ABD & PELVIS W/O CONTRAST: CPT | Performed by: RADIOLOGY

## 2025-07-02 PROCEDURE — 85610 PROTHROMBIN TIME: CPT

## 2025-07-02 PROCEDURE — 85730 THROMBOPLASTIN TIME PARTIAL: CPT

## 2025-07-02 PROCEDURE — 87086 URINE CULTURE/COLONY COUNT: CPT

## 2025-07-02 PROCEDURE — 36415 COLL VENOUS BLD VENIPUNCTURE: CPT

## 2025-07-02 PROCEDURE — 74176 CT ABD & PELVIS W/O CONTRAST: CPT

## 2025-07-02 PROCEDURE — 85025 COMPLETE CBC W/AUTO DIFF WBC: CPT

## 2025-07-03 LAB — BACTERIA UR CULT: NORMAL

## 2025-07-07 ENCOUNTER — ANESTHESIA EVENT (OUTPATIENT)
Dept: OPERATING ROOM | Facility: HOSPITAL | Age: 70
End: 2025-07-07

## 2025-07-08 ENCOUNTER — ANESTHESIA (OUTPATIENT)
Dept: OPERATING ROOM | Facility: HOSPITAL | Age: 70
End: 2025-07-08
Payer: COMMERCIAL

## 2025-07-22 ENCOUNTER — APPOINTMENT (OUTPATIENT)
Dept: UROLOGY | Facility: CLINIC | Age: 70
End: 2025-07-22
Payer: COMMERCIAL

## 2025-07-22 NOTE — PREPROCEDURE INSTRUCTIONS
Current Medications    Medication Instructions    amLODIPine (Norvasc) 10 mg tablet Take morning of surgery    aspirin 81 mg EC tablet Hold until after surgery      atorvastatin (Lipitor) 80 mg tablet Take morning of surgery    GARLIC ORAL Hold until after surgery    hydroCHLOROthiazide (HYDRODiuril) 25 mg tablet Hold day of surgery    ibuprofen 200 mg tablet Start holding today  7/22/25 until after surgery    losartan (Cozaar) 100 mg tablet Hold day of surgery    omega-3 acid ethyl esters (Lovaza) 1 gram capsule Hold day of surgery    TURMERIC ORAL Hold until after surgery       NPO Instructions:  Do not eat any food after midnight the night before your surgery/procedure.  You may have 13 ounces of clear liquids until TWO hours before arrival time. This includes water, black tea/coffee, (no milk or cream) apple juice and electrolyte drinks (Gatorade). No red colors.    Additional Instructions:   The day before surgery:  You will be contacted regarding the time of your arrival to facility and surgery time    The day of surgery:  Enter through the Main Entrance of [facility], located at [address] [facility]. Proceed to registration, located on the right hand side of the staircase. You will need your ID and insurance card for registration. Please ensure you have a responsible adult to drive you home. A responsible adult DOES NOT include rideshare service drivers (Uber, Lyft, etc), cab drivers, or public transportation drivers, but “provide a ride” is acceptable.    Take a shower before your procedure. After your shower avoid lotions, powders, deodorants or anything applied to the skin. If you wear contacts or glasses, wear the glasses. If you do not have glasses, please bring a case for your contacts. You may wear hearing aids and dentures, bring a case for them or we will provide one. Make sure you wear something loose and comfortable. Keep in mind your surgical procedure and wear something that will  accommodate incisions or bandages. Please remove all jewelry and piercing's.     For further questions Jaime HEWITT can be contacted at [phone] between 7AM-3PM.

## 2025-07-25 ENCOUNTER — APPOINTMENT (OUTPATIENT)
Dept: RADIOLOGY | Facility: HOSPITAL | Age: 70
End: 2025-07-25
Payer: COMMERCIAL

## 2025-07-25 ENCOUNTER — ANESTHESIA (OUTPATIENT)
Dept: OPERATING ROOM | Facility: HOSPITAL | Age: 70
End: 2025-07-25
Payer: COMMERCIAL

## 2025-07-25 ENCOUNTER — HOSPITAL ENCOUNTER (OUTPATIENT)
Facility: HOSPITAL | Age: 70
LOS: 1 days | Discharge: HOME | End: 2025-07-26
Payer: COMMERCIAL

## 2025-07-25 ENCOUNTER — ANESTHESIA EVENT (OUTPATIENT)
Dept: OPERATING ROOM | Facility: HOSPITAL | Age: 70
End: 2025-07-25
Payer: COMMERCIAL

## 2025-07-25 ENCOUNTER — PHARMACY VISIT (OUTPATIENT)
Dept: PHARMACY | Facility: CLINIC | Age: 70
End: 2025-07-25
Payer: COMMERCIAL

## 2025-07-25 DIAGNOSIS — N20.1 LEFT URETERAL CALCULUS: Primary | ICD-10-CM

## 2025-07-25 PROCEDURE — 2500000004 HC RX 250 GENERAL PHARMACY W/ HCPCS (ALT 636 FOR OP/ED): Performed by: STUDENT IN AN ORGANIZED HEALTH CARE EDUCATION/TRAINING PROGRAM

## 2025-07-25 PROCEDURE — 50081 PERQ NL/PL LITHOTRP CPLX>2CM: CPT

## 2025-07-25 PROCEDURE — 3700000001 HC GENERAL ANESTHESIA TIME - INITIAL BASE CHARGE

## 2025-07-25 PROCEDURE — 2550000001 HC RX 255 CONTRASTS: Mod: JW

## 2025-07-25 PROCEDURE — 2500000002 HC RX 250 W HCPCS SELF ADMINISTERED DRUGS (ALT 637 FOR MEDICARE OP, ALT 636 FOR OP/ED): Performed by: STUDENT IN AN ORGANIZED HEALTH CARE EDUCATION/TRAINING PROGRAM

## 2025-07-25 PROCEDURE — C1726 CATH, BAL DIL, NON-VASCULAR: HCPCS

## 2025-07-25 PROCEDURE — 3700000002 HC GENERAL ANESTHESIA TIME - EACH INCREMENTAL 1 MINUTE

## 2025-07-25 PROCEDURE — 2500000004 HC RX 250 GENERAL PHARMACY W/ HCPCS (ALT 636 FOR OP/ED): Mod: JZ | Performed by: ANESTHESIOLOGY

## 2025-07-25 PROCEDURE — 7100000002 HC RECOVERY ROOM TIME - EACH INCREMENTAL 1 MINUTE

## 2025-07-25 PROCEDURE — 2500000005 HC RX 250 GENERAL PHARMACY W/O HCPCS

## 2025-07-25 PROCEDURE — 2720000007 HC OR 272 NO HCPCS

## 2025-07-25 PROCEDURE — 3600000009 HC OR TIME - EACH INCREMENTAL 1 MINUTE - PROCEDURE LEVEL FOUR

## 2025-07-25 PROCEDURE — 82365 CALCULUS SPECTROSCOPY: CPT

## 2025-07-25 PROCEDURE — 2500000004 HC RX 250 GENERAL PHARMACY W/ HCPCS (ALT 636 FOR OP/ED): Performed by: ANESTHESIOLOGY

## 2025-07-25 PROCEDURE — 71045 X-RAY EXAM CHEST 1 VIEW: CPT

## 2025-07-25 PROCEDURE — 2780000003 HC OR 278 NO HCPCS

## 2025-07-25 PROCEDURE — 71045 X-RAY EXAM CHEST 1 VIEW: CPT | Performed by: RADIOLOGY

## 2025-07-25 PROCEDURE — C1769 GUIDE WIRE: HCPCS

## 2025-07-25 PROCEDURE — 50436 DILAT XST TRC NDURLGC PX: CPT

## 2025-07-25 PROCEDURE — 7100000001 HC RECOVERY ROOM TIME - INITIAL BASE CHARGE

## 2025-07-25 PROCEDURE — C1747 HC OR 272 NO HCPCS: HCPCS

## 2025-07-25 PROCEDURE — 7100000011 HC EXTENDED STAY RECOVERY HOURLY - NURSING UNIT

## 2025-07-25 PROCEDURE — C1758 CATHETER, URETERAL: HCPCS

## 2025-07-25 PROCEDURE — 3600000004 HC OR TIME - INITIAL BASE CHARGE - PROCEDURE LEVEL FOUR

## 2025-07-25 PROCEDURE — RXMED WILLOW AMBULATORY MEDICATION CHARGE

## 2025-07-25 PROCEDURE — 76000 FLUOROSCOPY <1 HR PHYS/QHP: CPT

## 2025-07-25 PROCEDURE — C2617 STENT, NON-COR, TEM W/O DEL: HCPCS

## 2025-07-25 PROCEDURE — 2500000001 HC RX 250 WO HCPCS SELF ADMINISTERED DRUGS (ALT 637 FOR MEDICARE OP): Performed by: STUDENT IN AN ORGANIZED HEALTH CARE EDUCATION/TRAINING PROGRAM

## 2025-07-25 PROCEDURE — 96372 THER/PROPH/DIAG INJ SC/IM: CPT | Performed by: STUDENT IN AN ORGANIZED HEALTH CARE EDUCATION/TRAINING PROGRAM

## 2025-07-25 RX ORDER — HEPARIN SODIUM 5000 [USP'U]/ML
5000 INJECTION, SOLUTION INTRAVENOUS; SUBCUTANEOUS EVERY 8 HOURS
Status: DISCONTINUED | OUTPATIENT
Start: 2025-07-25 | End: 2025-07-26 | Stop reason: HOSPADM

## 2025-07-25 RX ORDER — ONDANSETRON HYDROCHLORIDE 2 MG/ML
4 INJECTION, SOLUTION INTRAVENOUS EVERY 8 HOURS PRN
Status: DISCONTINUED | OUTPATIENT
Start: 2025-07-25 | End: 2025-07-26 | Stop reason: HOSPADM

## 2025-07-25 RX ORDER — OXYCODONE HYDROCHLORIDE 5 MG/1
10 TABLET ORAL EVERY 4 HOURS PRN
Status: DISCONTINUED | OUTPATIENT
Start: 2025-07-25 | End: 2025-07-26 | Stop reason: HOSPADM

## 2025-07-25 RX ORDER — CEFAZOLIN 1 G/1
INJECTION, POWDER, FOR SOLUTION INTRAVENOUS AS NEEDED
Status: DISCONTINUED | OUTPATIENT
Start: 2025-07-25 | End: 2025-07-25

## 2025-07-25 RX ORDER — SODIUM CHLORIDE 9 MG/ML
100 INJECTION, SOLUTION INTRAVENOUS CONTINUOUS
Status: DISCONTINUED | OUTPATIENT
Start: 2025-07-25 | End: 2025-07-26

## 2025-07-25 RX ORDER — LIDOCAINE HYDROCHLORIDE 20 MG/ML
INJECTION, SOLUTION INFILTRATION; PERINEURAL AS NEEDED
Status: DISCONTINUED | OUTPATIENT
Start: 2025-07-25 | End: 2025-07-25

## 2025-07-25 RX ORDER — LIDOCAINE HYDROCHLORIDE 10 MG/ML
0.1 INJECTION, SOLUTION INFILTRATION; PERINEURAL ONCE
Status: DISCONTINUED | OUTPATIENT
Start: 2025-07-25 | End: 2025-07-25 | Stop reason: HOSPADM

## 2025-07-25 RX ORDER — OXYCODONE HYDROCHLORIDE 5 MG/1
5 TABLET ORAL EVERY 6 HOURS PRN
Status: DISCONTINUED | OUTPATIENT
Start: 2025-07-25 | End: 2025-07-26 | Stop reason: HOSPADM

## 2025-07-25 RX ORDER — PHENAZOPYRIDINE HYDROCHLORIDE 100 MG/1
95 TABLET, FILM COATED ORAL
Status: DISCONTINUED | OUTPATIENT
Start: 2025-07-25 | End: 2025-07-26 | Stop reason: HOSPADM

## 2025-07-25 RX ORDER — ATORVASTATIN CALCIUM 80 MG/1
80 TABLET, FILM COATED ORAL DAILY
Status: DISCONTINUED | OUTPATIENT
Start: 2025-07-26 | End: 2025-07-26 | Stop reason: HOSPADM

## 2025-07-25 RX ORDER — HYDROMORPHONE HYDROCHLORIDE 1 MG/ML
1 INJECTION, SOLUTION INTRAMUSCULAR; INTRAVENOUS; SUBCUTANEOUS EVERY 5 MIN PRN
Status: DISCONTINUED | OUTPATIENT
Start: 2025-07-25 | End: 2025-07-25 | Stop reason: HOSPADM

## 2025-07-25 RX ORDER — TAMSULOSIN HYDROCHLORIDE 0.4 MG/1
0.4 CAPSULE ORAL DAILY
Qty: 30 CAPSULE | Refills: 0 | Status: SHIPPED | OUTPATIENT
Start: 2025-07-25

## 2025-07-25 RX ORDER — GLYCOPYRROLATE 0.2 MG/ML
INJECTION INTRAMUSCULAR; INTRAVENOUS AS NEEDED
Status: DISCONTINUED | OUTPATIENT
Start: 2025-07-25 | End: 2025-07-25

## 2025-07-25 RX ORDER — PROPOFOL 10 MG/ML
INJECTION, EMULSION INTRAVENOUS AS NEEDED
Status: DISCONTINUED | OUTPATIENT
Start: 2025-07-25 | End: 2025-07-25

## 2025-07-25 RX ORDER — SODIUM CHLORIDE 0.9 G/100ML
INJECTION, SOLUTION IRRIGATION AS NEEDED
Status: DISCONTINUED | OUTPATIENT
Start: 2025-07-25 | End: 2025-07-25 | Stop reason: HOSPADM

## 2025-07-25 RX ORDER — CHLORHEXIDINE GLUCONATE 40 MG/ML
SOLUTION TOPICAL DAILY PRN
Status: DISCONTINUED | OUTPATIENT
Start: 2025-07-25 | End: 2025-07-25 | Stop reason: HOSPADM

## 2025-07-25 RX ORDER — ACETAMINOPHEN 500 MG
1000 TABLET ORAL EVERY 6 HOURS PRN
Qty: 30 TABLET | Refills: 0 | Status: SHIPPED | OUTPATIENT
Start: 2025-07-25

## 2025-07-25 RX ORDER — CEFAZOLIN SODIUM 2 G/50ML
2 SOLUTION INTRAVENOUS ONCE
Status: DISCONTINUED | OUTPATIENT
Start: 2025-07-25 | End: 2025-07-25 | Stop reason: HOSPADM

## 2025-07-25 RX ORDER — ONDANSETRON HYDROCHLORIDE 2 MG/ML
INJECTION, SOLUTION INTRAVENOUS AS NEEDED
Status: DISCONTINUED | OUTPATIENT
Start: 2025-07-25 | End: 2025-07-25

## 2025-07-25 RX ORDER — POLYETHYLENE GLYCOL 3350 17 G/17G
17 POWDER, FOR SOLUTION ORAL DAILY
Status: DISCONTINUED | OUTPATIENT
Start: 2025-07-25 | End: 2025-07-26 | Stop reason: HOSPADM

## 2025-07-25 RX ORDER — PHENYLEPHRINE HCL IN 0.9% NACL 1 MG/10 ML
SYRINGE (ML) INTRAVENOUS AS NEEDED
Status: DISCONTINUED | OUTPATIENT
Start: 2025-07-25 | End: 2025-07-25

## 2025-07-25 RX ORDER — ONDANSETRON 4 MG/1
4 TABLET, FILM COATED ORAL EVERY 8 HOURS PRN
Status: DISCONTINUED | OUTPATIENT
Start: 2025-07-25 | End: 2025-07-26 | Stop reason: HOSPADM

## 2025-07-25 RX ORDER — ACETAMINOPHEN 500 MG
5 TABLET ORAL NIGHTLY PRN
Status: DISCONTINUED | OUTPATIENT
Start: 2025-07-25 | End: 2025-07-26 | Stop reason: HOSPADM

## 2025-07-25 RX ORDER — MIDAZOLAM HYDROCHLORIDE 1 MG/ML
INJECTION, SOLUTION INTRAMUSCULAR; INTRAVENOUS AS NEEDED
Status: DISCONTINUED | OUTPATIENT
Start: 2025-07-25 | End: 2025-07-25

## 2025-07-25 RX ORDER — ASPIRIN 81 MG/1
81 TABLET ORAL DAILY
Status: DISCONTINUED | OUTPATIENT
Start: 2025-07-25 | End: 2025-07-26 | Stop reason: HOSPADM

## 2025-07-25 RX ORDER — ACETAMINOPHEN 325 MG/1
975 TABLET ORAL EVERY 6 HOURS
Status: DISCONTINUED | OUTPATIENT
Start: 2025-07-25 | End: 2025-07-26 | Stop reason: HOSPADM

## 2025-07-25 RX ORDER — NALOXONE HYDROCHLORIDE 1 MG/ML
0.2 INJECTION INTRAMUSCULAR; INTRAVENOUS; SUBCUTANEOUS EVERY 5 MIN PRN
Status: DISCONTINUED | OUTPATIENT
Start: 2025-07-25 | End: 2025-07-26 | Stop reason: HOSPADM

## 2025-07-25 RX ORDER — HYDROCHLOROTHIAZIDE 25 MG/1
25 TABLET ORAL DAILY
Status: DISCONTINUED | OUTPATIENT
Start: 2025-07-25 | End: 2025-07-26 | Stop reason: HOSPADM

## 2025-07-25 RX ORDER — FENTANYL CITRATE 50 UG/ML
INJECTION, SOLUTION INTRAMUSCULAR; INTRAVENOUS AS NEEDED
Status: DISCONTINUED | OUTPATIENT
Start: 2025-07-25 | End: 2025-07-25

## 2025-07-25 RX ORDER — TAMSULOSIN HYDROCHLORIDE 0.4 MG/1
0.4 CAPSULE ORAL DAILY
Status: DISCONTINUED | OUTPATIENT
Start: 2025-07-25 | End: 2025-07-26 | Stop reason: HOSPADM

## 2025-07-25 RX ORDER — CIPROFLOXACIN 500 MG/1
500 TABLET, FILM COATED ORAL 2 TIMES DAILY
Qty: 10 TABLET | Refills: 0 | Status: SHIPPED | OUTPATIENT
Start: 2025-07-25 | End: 2025-07-30

## 2025-07-25 RX ORDER — PHENAZOPYRIDINE HYDROCHLORIDE 200 MG/1
200 TABLET, FILM COATED ORAL 3 TIMES DAILY
Qty: 15 TABLET | Refills: 0 | Status: SHIPPED | OUTPATIENT
Start: 2025-07-25 | End: 2025-07-30

## 2025-07-25 RX ORDER — KETOROLAC TROMETHAMINE 30 MG/ML
15 INJECTION, SOLUTION INTRAMUSCULAR; INTRAVENOUS EVERY 8 HOURS
Status: DISCONTINUED | OUTPATIENT
Start: 2025-07-25 | End: 2025-07-26 | Stop reason: HOSPADM

## 2025-07-25 RX ORDER — KETOROLAC TROMETHAMINE 10 MG/1
10 TABLET, FILM COATED ORAL EVERY 6 HOURS PRN
Qty: 12 TABLET | Refills: 0 | Status: SHIPPED | OUTPATIENT
Start: 2025-07-25 | End: 2025-07-28

## 2025-07-25 RX ORDER — AMLODIPINE BESYLATE 10 MG/1
10 TABLET ORAL DAILY
Status: DISCONTINUED | OUTPATIENT
Start: 2025-07-26 | End: 2025-07-26 | Stop reason: HOSPADM

## 2025-07-25 RX ORDER — CEFAZOLIN SODIUM 2 G/50ML
2 SOLUTION INTRAVENOUS EVERY 8 HOURS
Status: DISCONTINUED | OUTPATIENT
Start: 2025-07-25 | End: 2025-07-26 | Stop reason: HOSPADM

## 2025-07-25 RX ADMIN — TAMSULOSIN HYDROCHLORIDE 0.4 MG: 0.4 CAPSULE ORAL at 20:33

## 2025-07-25 RX ADMIN — Medication 200 MCG: at 07:45

## 2025-07-25 RX ADMIN — PROPOFOL 200 MG: 10 INJECTION, EMULSION INTRAVENOUS at 07:32

## 2025-07-25 RX ADMIN — OXYCODONE HYDROCHLORIDE 10 MG: 5 TABLET ORAL at 13:57

## 2025-07-25 RX ADMIN — HYDROMORPHONE HYDROCHLORIDE 1 MG: 1 INJECTION, SOLUTION INTRAMUSCULAR; INTRAVENOUS; SUBCUTANEOUS at 11:28

## 2025-07-25 RX ADMIN — ONDANSETRON 4 MG: 2 INJECTION INTRAMUSCULAR; INTRAVENOUS at 13:57

## 2025-07-25 RX ADMIN — FENTANYL CITRATE 100 MCG: 50 INJECTION, SOLUTION INTRAMUSCULAR; INTRAVENOUS at 07:32

## 2025-07-25 RX ADMIN — MIDAZOLAM 2 MG: 1 INJECTION INTRAMUSCULAR; INTRAVENOUS at 07:32

## 2025-07-25 RX ADMIN — HYDROCHLOROTHIAZIDE 25 MG: 25 TABLET ORAL at 14:02

## 2025-07-25 RX ADMIN — LIDOCAINE HYDROCHLORIDE 100 MG: 20 INJECTION, SOLUTION INFILTRATION; PERINEURAL at 07:32

## 2025-07-25 RX ADMIN — PHENAZOPYRIDINE 100 MG: 100 TABLET ORAL at 17:06

## 2025-07-25 RX ADMIN — ACETAMINOPHEN 975 MG: 325 TABLET ORAL at 13:57

## 2025-07-25 RX ADMIN — HEPARIN SODIUM 5000 UNITS: 5000 INJECTION, SOLUTION INTRAVENOUS; SUBCUTANEOUS at 20:33

## 2025-07-25 RX ADMIN — CEFAZOLIN 2 G: 330 INJECTION, POWDER, FOR SOLUTION INTRAMUSCULAR; INTRAVENOUS at 07:32

## 2025-07-25 RX ADMIN — ONDANSETRON 4 MG: 2 INJECTION, SOLUTION INTRAMUSCULAR; INTRAVENOUS at 07:56

## 2025-07-25 RX ADMIN — KETOROLAC TROMETHAMINE 15 MG: 30 INJECTION, SOLUTION INTRAMUSCULAR at 15:46

## 2025-07-25 RX ADMIN — SODIUM CHLORIDE 100 ML/HR: 0.9 INJECTION, SOLUTION INTRAVENOUS at 15:53

## 2025-07-25 RX ADMIN — CEFAZOLIN SODIUM 2 G: 2 SOLUTION INTRAVENOUS at 15:48

## 2025-07-25 RX ADMIN — ACETAMINOPHEN 975 MG: 325 TABLET ORAL at 20:33

## 2025-07-25 RX ADMIN — GLYCOPYRROLATE 0.2 MG: 0.2 INJECTION, SOLUTION INTRAMUSCULAR; INTRAVENOUS at 08:06

## 2025-07-25 RX ADMIN — ASPIRIN 81 MG: 81 TABLET, COATED ORAL at 14:02

## 2025-07-25 RX ADMIN — HYDROMORPHONE HYDROCHLORIDE 0.5 MG: 1 INJECTION, SOLUTION INTRAMUSCULAR; INTRAVENOUS; SUBCUTANEOUS at 12:21

## 2025-07-25 RX ADMIN — Medication 100 MCG: at 08:05

## 2025-07-25 RX ADMIN — Medication 100 MCG: at 07:55

## 2025-07-25 SDOH — SOCIAL STABILITY: SOCIAL INSECURITY: ARE THERE ANY APPARENT SIGNS OF INJURIES/BEHAVIORS THAT COULD BE RELATED TO ABUSE/NEGLECT?: NO

## 2025-07-25 SDOH — SOCIAL STABILITY: SOCIAL INSECURITY: WITHIN THE LAST YEAR, HAVE YOU BEEN AFRAID OF YOUR PARTNER OR EX-PARTNER?: NO

## 2025-07-25 SDOH — SOCIAL STABILITY: SOCIAL INSECURITY
WITHIN THE LAST YEAR, HAVE YOU BEEN KICKED, HIT, SLAPPED, OR OTHERWISE PHYSICALLY HURT BY YOUR PARTNER OR EX-PARTNER?: NO

## 2025-07-25 SDOH — ECONOMIC STABILITY: FOOD INSECURITY: WITHIN THE PAST 12 MONTHS, YOU WORRIED THAT YOUR FOOD WOULD RUN OUT BEFORE YOU GOT THE MONEY TO BUY MORE.: NEVER TRUE

## 2025-07-25 SDOH — SOCIAL STABILITY: SOCIAL INSECURITY: HAVE YOU HAD THOUGHTS OF HARMING ANYONE ELSE?: NO

## 2025-07-25 SDOH — ECONOMIC STABILITY: FOOD INSECURITY: WITHIN THE PAST 12 MONTHS, THE FOOD YOU BOUGHT JUST DIDN'T LAST AND YOU DIDN'T HAVE MONEY TO GET MORE.: NEVER TRUE

## 2025-07-25 SDOH — SOCIAL STABILITY: SOCIAL INSECURITY
WITHIN THE LAST YEAR, HAVE YOU BEEN RAPED OR FORCED TO HAVE ANY KIND OF SEXUAL ACTIVITY BY YOUR PARTNER OR EX-PARTNER?: NO

## 2025-07-25 SDOH — ECONOMIC STABILITY: INCOME INSECURITY: IN THE PAST 12 MONTHS HAS THE ELECTRIC, GAS, OIL, OR WATER COMPANY THREATENED TO SHUT OFF SERVICES IN YOUR HOME?: NO

## 2025-07-25 SDOH — SOCIAL STABILITY: SOCIAL INSECURITY: DOES ANYONE TRY TO KEEP YOU FROM HAVING/CONTACTING OTHER FRIENDS OR DOING THINGS OUTSIDE YOUR HOME?: NO

## 2025-07-25 SDOH — SOCIAL STABILITY: SOCIAL INSECURITY: WITHIN THE LAST YEAR, HAVE YOU BEEN HUMILIATED OR EMOTIONALLY ABUSED IN OTHER WAYS BY YOUR PARTNER OR EX-PARTNER?: NO

## 2025-07-25 SDOH — SOCIAL STABILITY: SOCIAL INSECURITY: HAVE YOU HAD ANY THOUGHTS OF HARMING ANYONE ELSE?: NO

## 2025-07-25 SDOH — SOCIAL STABILITY: SOCIAL INSECURITY: ARE YOU OR HAVE YOU BEEN THREATENED OR ABUSED PHYSICALLY, EMOTIONALLY, OR SEXUALLY BY ANYONE?: NO

## 2025-07-25 SDOH — SOCIAL STABILITY: SOCIAL INSECURITY: DO YOU FEEL UNSAFE GOING BACK TO THE PLACE WHERE YOU ARE LIVING?: NO

## 2025-07-25 SDOH — SOCIAL STABILITY: SOCIAL INSECURITY: HAS ANYONE EVER THREATENED TO HURT YOUR FAMILY OR YOUR PETS?: NO

## 2025-07-25 SDOH — SOCIAL STABILITY: SOCIAL INSECURITY: DO YOU FEEL ANYONE HAS EXPLOITED OR TAKEN ADVANTAGE OF YOU FINANCIALLY OR OF YOUR PERSONAL PROPERTY?: NO

## 2025-07-25 SDOH — SOCIAL STABILITY: SOCIAL INSECURITY: WERE YOU ABLE TO COMPLETE ALL THE BEHAVIORAL HEALTH SCREENINGS?: YES

## 2025-07-25 SDOH — HEALTH STABILITY: MENTAL HEALTH: CURRENT SMOKER: 0

## 2025-07-25 SDOH — SOCIAL STABILITY: SOCIAL INSECURITY: ABUSE: ADULT

## 2025-07-25 ASSESSMENT — ACTIVITIES OF DAILY LIVING (ADL)
HEARING - RIGHT EAR: FUNCTIONAL
TOILETING: INDEPENDENT
HEARING - LEFT EAR: FUNCTIONAL
BATHING: INDEPENDENT
WALKS IN HOME: INDEPENDENT
ADEQUATE_TO_COMPLETE_ADL: YES
LACK_OF_TRANSPORTATION: NO
LACK_OF_TRANSPORTATION: NO
GROOMING: INDEPENDENT
FEEDING YOURSELF: INDEPENDENT
DRESSING YOURSELF: INDEPENDENT
JUDGMENT_ADEQUATE_SAFELY_COMPLETE_DAILY_ACTIVITIES: YES
PATIENT'S MEMORY ADEQUATE TO SAFELY COMPLETE DAILY ACTIVITIES?: YES

## 2025-07-25 ASSESSMENT — COGNITIVE AND FUNCTIONAL STATUS - GENERAL
DAILY ACTIVITIY SCORE: 24
PATIENT BASELINE BEDBOUND: NO
MOBILITY SCORE: 24

## 2025-07-25 ASSESSMENT — PAIN DESCRIPTION - LOCATION
LOCATION: BACK
LOCATION: BACK

## 2025-07-25 ASSESSMENT — PAIN SCALES - GENERAL
PAINLEVEL_OUTOF10: 0 - NO PAIN
PAINLEVEL_OUTOF10: 3
PAINLEVEL_OUTOF10: 0 - NO PAIN
PAINLEVEL_OUTOF10: 0 - NO PAIN
PAINLEVEL_OUTOF10: 3
PAINLEVEL_OUTOF10: 0 - NO PAIN
PAINLEVEL_OUTOF10: 0 - NO PAIN
PAINLEVEL_OUTOF10: 2
PAIN_LEVEL: 0
PAINLEVEL_OUTOF10: 8
PAINLEVEL_OUTOF10: 5 - MODERATE PAIN
PAINLEVEL_OUTOF10: 7
PAINLEVEL_OUTOF10: 0 - NO PAIN
PAINLEVEL_OUTOF10: 4
PAINLEVEL_OUTOF10: 0 - NO PAIN
PAINLEVEL_OUTOF10: 4
PAINLEVEL_OUTOF10: 0 - NO PAIN
PAINLEVEL_OUTOF10: 7

## 2025-07-25 ASSESSMENT — PAIN - FUNCTIONAL ASSESSMENT
PAIN_FUNCTIONAL_ASSESSMENT: 0-10

## 2025-07-25 ASSESSMENT — PAIN DESCRIPTION - DESCRIPTORS
DESCRIPTORS: ACHING

## 2025-07-25 ASSESSMENT — COLUMBIA-SUICIDE SEVERITY RATING SCALE - C-SSRS
6. HAVE YOU EVER DONE ANYTHING, STARTED TO DO ANYTHING, OR PREPARED TO DO ANYTHING TO END YOUR LIFE?: NO
2. HAVE YOU ACTUALLY HAD ANY THOUGHTS OF KILLING YOURSELF?: NO
1. IN THE PAST MONTH, HAVE YOU WISHED YOU WERE DEAD OR WISHED YOU COULD GO TO SLEEP AND NOT WAKE UP?: NO

## 2025-07-25 ASSESSMENT — LIFESTYLE VARIABLES
HOW MANY STANDARD DRINKS CONTAINING ALCOHOL DO YOU HAVE ON A TYPICAL DAY: PATIENT DOES NOT DRINK
HOW OFTEN DO YOU HAVE 6 OR MORE DRINKS ON ONE OCCASION: NEVER
AUDIT-C TOTAL SCORE: 0
SKIP TO QUESTIONS 9-10: 1
AUDIT-C TOTAL SCORE: 0
HOW OFTEN DO YOU HAVE A DRINK CONTAINING ALCOHOL: NEVER

## 2025-07-25 ASSESSMENT — PAIN DESCRIPTION - ORIENTATION
ORIENTATION: LEFT;MID
ORIENTATION: LEFT;MID

## 2025-07-25 ASSESSMENT — PATIENT HEALTH QUESTIONNAIRE - PHQ9
1. LITTLE INTEREST OR PLEASURE IN DOING THINGS: NOT AT ALL
2. FEELING DOWN, DEPRESSED OR HOPELESS: NOT AT ALL
SUM OF ALL RESPONSES TO PHQ9 QUESTIONS 1 & 2: 0

## 2025-07-25 NOTE — ANESTHESIA PROCEDURE NOTES
Airway  Date/Time: 7/25/2025 7:35 AM  Reason: elective    Airway not difficult    Staffing  Performed: attending   Authorized by: Miguel Jean MD    Performed by: Miguel Jean MD  Patient location during procedure: OR    Patient Condition  Indications for airway management: anesthesia  Patient position: sniffing  MILS maintained throughout  Planned trial extubation  Sedation level: deep     Final Airway Details   Preoxygenated: yes  Final airway type: supraglottic airway  Successful airway: ProSeal  Size: 4   Ventilation between attempts: none  Number of attempts at approach: 1  Number of other approaches attempted: 0    Additional Comments  #4 [id number]

## 2025-07-25 NOTE — DISCHARGE INSTRUCTIONS
[facility] [place]    DISCHARGE INSTRUCTIONS     Percutaneous Nephrolithotomy (PCNL)    Norbert Avalos    Call [phone] during regular daytime business hours (8:00 am - 5:00 pm) and after 5:00 pm and ask for the Urology resident with any questions or concerns.    If it is a life-threatening situation, proceed to the nearest emergency department.        Follow-up appointment:  1-2 weeks. Please call our office to schedule an appointment if one has not been arranged already. Thank you!      Thank you for the opportunity to care for you today.  Your health and healing are very important to us.  We hope we made you feel as comfortable as possible and are committed to your recovery and continued well-being.      The following is a brief overview of your PCNL(percutaneous nephrolithotomy) procedure today. Some of the information contained on this summary may be confidential.  This information should be kept in your records and should be shared with your regular doctor.    Physicians:   Dr. Monzon    Procedure performed: Lasering of your kidney stones through access gained in your back      What to Expect During your Recovery and Home Care  Anesthesia Side Effects   You received anesthesia today.  You may feel sleepy, tired, or have a sore throat.   You may also feel drowsiness, dizziness, or inability to think clearly.  For your safety, do not drive, drink alcoholic beverages, take any unprescribed medication or make any important decisions for 24 hours.  A responsible adult should be with you for 24 hours.        Activity and Recovery    No heavy lifting. Rest for the next 24 hours.     Pain Control  Unfortunately, you may experience pain after your procedure.  Frequency and urgency to urinate and mild discomfort are expected. Adequate pain management can include alternative measures to help ease your pain and that can include over the counter Tylenol and a heating pad. Do not take more than 4,000mg of Tylenol in  a 24-hour period.      You may have also been prescribed Pyridium (for burning sensation) and Ditropan(for bladder spasms) for pain control. Pyridium will turn your urine bright orange.    Nausea/Vomiting   Clear liquids are best tolerated at first. Start slow, advance your diet as tolerated to normal foods. Avoid spicy, greasy, heavy foods at first. Also, you may feel nauseous or like you need to vomit if you take any type of medication on an empty stomach.  Call your physician if you are unable to eat or drink and have persistent vomiting.    Signs of Bleeding   You could have some blood in your urine off and on over the next several weeks. Your urine will be light pink to yellow.    Treatment/wound care:   It is okay to shower 24 hours after time of surgery. If you go home with a tube(s) in your back do not submerge them in water. (no tub bathing, or swimming)    Signs of Infection  Signs of infection can include fever, chills, burning sensation with passing of urine, or severe abdominal pain.  If you see any of these occur, please contact your doctor's office at [phone].  Any fever higher than 100.4, especially if associated with an ill feeling, abdominal pain, chills, or nausea should be reported to your surgeon.      Assist in bowel movements/urination  Increase fiber in diet  Increase water (6 to 8 glasses)  Increase walking   Urination should occur within 6 hours of anesthesia  If you have tried these methods and your bladder still feels full and you cannot use the bathroom, please go to your nearest Emergency room/contact your physician.    Additional Instructions:   Pieces of your kidney stone may pass in the urine for a few days and may cause some mild pain. You also had a stent placed today from your kidney down into your bladder. This helps keep the urinary tract open and prevents blockages of urine flow. The stent can be irritating and can cause some frequency, urgency and blood in your urine when  you go to the bathroom. It is important to drink plenty of water and take medications as prescribed. You may be sent home with Pyridium which turns your urine bright orange. Applying a heating pad to your back will also help with this kind of pain. It will be determined at your follow up appointment when the stent will be removed.     You may also go home with your nephrostomy tube (tube in your back). Dressings for this tube should be changed every three days. Clean around insertion site with alcohol wipe, cover with 4x4 gauze. Monitor for infection at insertion site with dressing change. Signs of infection include green/yellow drainage, swelling, warmth, redness, fever and chills.

## 2025-07-25 NOTE — OP NOTE
LEFT MINI PERCUTANEOUS NEPHROLITHOTOMY WITH C-ARM (L) Operative Note     Date: 2025  OR Location: [facility]    Name: Norbert Avalos, : 1955, Age: 70 y.o., MRN: [id number], Sex: male    Diagnosis  Pre-op Diagnosis      * Left ureteral calculus [N20.1] Post-op Diagnosis     * Left ureteral calculus [N20.1]     Procedures  LEFT MINI PERCUTANEOUS NEPHROLITHOTOMY WITH C-ARM  49877 - ND PERQ NL/PL LITHOTRP SIMPLE UP TO 2 CM 1 LOCATION      Surgeons      * Scott Monzon - Primary    Resident/Fellow/Other Assistant:  Surgeons and Role:     * Elmer Polk MD - Assisting     * Ayaz Werner MD - Resident - Assisting    Staff:   Circulator: Maritza Woodruff Person: Cesar  Surgical Assistant: Rick    Anesthesia Staff: Anesthesiologist: Miguel Jean MD    Procedure Summary  Anesthesia: General  ASA: II  Estimated Blood Loss: ***mL  Intra-op Medications:   Administrations occurring from 0730 to 1030 on 25:   Medication Name Total Dose   sodium chloride 0.9 % irrigation solution 3,000 mL   iohexol (OMNIPaque) 240 mg iodine/mL solution 58 mL   ceFAZolin (Ancef) vial 1 g 2 g   fentaNYL (Sublimaze) injection 50 mcg/mL 100 mcg   glycopyrrolate (Robinul) injection 0.2 mg   lidocaine (Xylocaine) injection 2 % 100 mg   midazolam (Versed) injection 1 mg/mL 2 mg   ondansetron 2 mg/mL 4 mg   phenylephrine 100 mcg/mL syringe 10 mL (prefilled) 400 mcg   propofol (Diprivan) injection 10 mg/mL 200 mg              Anesthesia Record               Intraprocedure I/O Totals       None           Specimen:   ID Type Source Tests Collected by Time   A : LEFT KIDNEY STONE Calculus Ureter, Left CALCULI (STONE) ANALYSIS Scott Monzon MD 2025 0937                 Drains and/or Catheters:   Ureteral Drain/Stent Left ureter 6 Fr. (Active)       Tourniquet Times:         Implants:     Findings: ***    Indications: Norbert Avalos is an 70 y.o. male who is having surgery for Left ureteral calculus [N20.1].  "***    The patient was seen in the preoperative area. The risks, benefits, complications, treatment options, non-operative alternatives, expected recovery and outcomes were discussed with the patient. The possibilities of reaction to medication, pulmonary aspiration, injury to surrounding structures, bleeding, recurrent infection, the need for additional procedures, failure to diagnose a condition, and creating a complication requiring transfusion or operation were discussed with the patient. The patient concurred with the proposed plan, giving informed consent.  The site of surgery was properly noted/marked if necessary per policy. The patient has been actively warmed in preoperative area. Preoperative antibiotics {OR OPERATIVE ANTIBIOTICS:9573901593} Venous thrombosis prophylaxis {OR OPERATIVE PROPHYLAXIS:5348184499}    Procedure Details: ***  Evidence of Infection: {OR EVIDENCE OF INFECTION:34282::\"No \"}  Complications:  {findings; complications:30317::\"None; patient tolerated the procedure well.\"}    Disposition: {Op note disposition:39768}  Condition: {stable/unstable:11768::\"stable\"}         Task Performed by RNFA or Surgical Assistant:  ***          Additional Details: ***    Attending Attestation: {Op note attestation (Optional):76485}    Scott Monzon  Phone Number: [phone]      "

## 2025-07-25 NOTE — ANESTHESIA POSTPROCEDURE EVALUATION
Patient: Norbert Avalos    Procedure Summary       Date: 07/25/25 Room / Location: [facility] 02 / [facility]    Anesthesia Start: 0730 Anesthesia Stop: 0952    Procedure: LEFT MINI PERCUTANEOUS NEPHROLITHOTOMY WITH C-ARM (Left: Ureter) Diagnosis:       Left ureteral calculus      (Left ureteral calculus [N20.1])    Surgeons: Scott Monzon MD Responsible Provider: Miguel Jean MD    Anesthesia Type: general ASA Status: 2            Anesthesia Type: general    Vitals Value Taken Time   /98 07/25/25 09:53   Temp 36.5 07/25/25 09:53   Pulse 106 07/25/25 09:53   Resp 16 07/25/25 09:53   SpO2 100 07/25/25 09:53       Anesthesia Post Evaluation    Patient location during evaluation: PACU  Patient participation: complete - patient participated  Level of consciousness: awake and alert  Pain score: 0  Pain management: adequate  Airway patency: patent  Cardiovascular status: acceptable  Respiratory status: acceptable  Hydration status: acceptable  Postoperative Nausea and Vomiting: none        No notable events documented.

## 2025-07-25 NOTE — OP NOTE
LEFT MINI PERCUTANEOUS NEPHROLITHOTOMY WITH C-ARM (L) Operative Note     Date: 2025  OR Location: [facility]    Name: Norbert Avalos, : 1955, Age: 70 y.o., MRN: [id number], Sex: male    Diagnosis  Pre-op Diagnosis      * Left ureteral calculus [N20.1] Post-op Diagnosis     * Left ureteral calculus [N20.1]     Procedures  LEFT MINI PERCUTANEOUS NEPHROLITHOTOMY WITH C-ARM  11777 - ME PERQ NL/PL LITHOTRP SIMPLE UP TO 2 CM 1 LOCATION      Surgeons      * Scott Monzon - Primary    Resident/Fellow/Other Assistant:  Surgeons and Role:     * Elmer Polk MD - Assisting     * Ayaz Werner MD - Resident - Assisting    Staff:   Circulator: Maritza Woodruff Person: Cesar  Surgical Assistant: Rick    Anesthesia Staff: Anesthesiologist: Miguel Jean MD    Procedure Summary  Anesthesia: General  ASA: II  Estimated Blood Loss: 10 mL  Intra-op Medications:   Administrations occurring from 0730 to 1030 on 25:   Medication Name Total Dose   sodium chloride 0.9 % irrigation solution 3,000 mL   iohexol (OMNIPaque) 240 mg iodine/mL solution 58 mL   ceFAZolin (Ancef) vial 1 g 2 g   fentaNYL (Sublimaze) injection 50 mcg/mL 100 mcg   glycopyrrolate (Robinul) injection 0.2 mg   lidocaine (Xylocaine) injection 2 % 100 mg   midazolam (Versed) injection 1 mg/mL 2 mg   ondansetron 2 mg/mL 4 mg   phenylephrine 100 mcg/mL syringe 10 mL (prefilled) 400 mcg   propofol (Diprivan) injection 10 mg/mL 200 mg              Anesthesia Record               Intraprocedure I/O Totals       None           Specimen:   ID Type Source Tests Collected by Time   A : LEFT KIDNEY STONE Calculus Ureter, Left CALCULI (STONE) ANALYSIS Scott Monzon MD 2025 0937                 Drains and/or Catheters:   Ureteral Drain/Stent Left ureter 6 Fr. (Active)       Findings: Severely impacted left proximal ureteral stone, completely treated, but some definitive residual scar tissue present. Upper pole access obtained with no concern  for surrounding organ injury    Indications: Norbert Avalos is an 70 y.o. male who is having surgery for Left ureteral calculus [N20.1].     The patient was seen in the preoperative area. The risks, benefits, complications, treatment options, non-operative alternatives, expected recovery and outcomes were discussed with the patient. The possibilities of reaction to medication, pulmonary aspiration, injury to surrounding structures, bleeding, recurrent infection, the need for additional procedures, failure to diagnose a condition, and creating a complication requiring transfusion or operation were discussed with the patient. The patient concurred with the proposed plan, giving informed consent.  The site of surgery was properly noted/marked if necessary per policy. The patient has been actively warmed in preoperative area. Preoperative antibiotics have been ordered and given within 1 hours of incision. Venous thrombosis prophylaxis have been ordered including bilateral sequential compression devices    Procedure Details:     Stone size: Primary stone 1 cm  Access location: Upper pole  Access approach: Ultrasound-guided  Sheath size: 12-14Fr ureteral access sheath  Holmium laser: 200 micron  Stone clearance: Full clearance  Other findings: Severely impacted ureteral stone making retrograde laser lithotripsy     The patient was identified in the perioperative area.  They were informed of the risks and benefits of the procedure and consented as appropriate.  They were wheeled to the operating room where a time-out was performed using 2 patient identifiers.  They were then induced into general endotracheal anesthesia, placed in a sloppy lateral (left side up) with legs in dorsal lithotomy and prepped and draped in the usual sterile fashion.  Antibiotic prophylaxis were administered (IV Ancef).    We began by inserting a cystoscope into the patient's bladder.  Cystoscopy did not reveal any lesions or masses in the  bladder mucosa. Attention was turned to the left ureteral orifice which was cannulated with a 5-[place]vian open-ended catheter and Sensor wire.  We removed the sensor wire, taking care to maintain the 5Fr ureteral catheter in place. Retrograde pyelogram was performed which revealed contrast not migrating beyond the proximal left ureter, as expected from patient's previous operative findings when a stent was attempted.    We then used an ultrasound probe to obtain access in antegrade fashion. Using radiographic guidance, we were able to identify an upper pole calyx of the kidney that was amenable for percutaneous drainage. An 18G long spinal needle was used to get into this upper pole calyx and access was confirmed when urine was seen after inner stylet was  removed. We performed antegrade nephrostogram which confirmed we were in an upper pole calyx between the 11th and 12th ribs and no contrast made it beyond the proximal left ureter. A glide was was placed and coiled into the left renal pelvis.We then extended our incision from the back using an 11 blade. The needle was removed, leaving the wire in place. We then sequentially dilated our tract up to 12Fr without much of an issue. We then utilized the 8/10Fr dilator from the renal access kit. The inner 8Fr dilator was removed and another sensor wire was advanced into the left renal pelvis. However, when attempting to use a metal dilator up to the 17.5Fr access sheath size, this was met with kinking of the wire despite having multiple attempts.    Prior to doing so we performed a ureteroscopy in retrograde fashion to visualize the stone. The scope was advanced up to the proximal ureter and this area appeared significantly impacted with stone to the point where we could not safely use a laser to fragment the stone. As such, we returned back to our antegrade approach. As such, decision was made to utilize the ClearPetra ureteral access sheath.     Using the sensor wire, we  placed a 28 cm ureteral access sheath to the level of the renal pelvis. Sensor wire was removed.  We then inserted the flexible ureteroscope and performed pan pyeloscopy. A large impacted stone was indeed seen near the left UPJ and proximal ureter. We then proceeded with laser lithotripsy of the stone using a 200 micron fiber. The stone was completely fragmented into tiny pieces, taking care not to injury the surrounding ureter, however this area was significantly scarred and impacted. We were able to get to the other side of the stone burden into the healthy more distal aspect of the left ureter. Once the stone burden was cleared, we placed a sensor wire through the scope and left ureter and confirmed the wire was in the bladder on fluoroscopy. Scope was removed. A 6x26 JJ stent was then advanced in antegrade fashion and proximal end was confirmed to be in the renal pelvis and distal end in the bladder on fluoroscopy. Ureteral access sheath was removed, along with both wires.     Stone fragments were sent for stone analysis.  We applied pressure to the incision area for approximately 2 minutes.    We then reapproximated the skin with 4-0 Vicryl suture.     The patient was placed in supine position, extubated and transferred to PACU in satisfactory condition.    DISCHARGE AND FOLLOWUP PLAN: The patient will be admitted overnight to the hospital for observation. We will have labs drawn. CXR will be performed in PACU to confirm no pneumothorax.      Evidence of Infection: No   Complications:  None; patient tolerated the procedure well.    Disposition: PACU - hemodynamically stable.  Condition: stable       Attending Attestation: I was present and scrubbed for the entire procedure.    Scott Monzon  Phone Number: [phone]

## 2025-07-25 NOTE — ANESTHESIA PREPROCEDURE EVALUATION
Patient: Norbert Avalos    Procedure Information       Date/Time: 07/25/25 0730    Procedure: LEFT MINI PERCUTANEOUS NEPHROLITHOTOMY WITH C-ARM (Left: Ureter) - Left Percutaneous Nephrolithotomy    Location: [facility] / [facility]    Surgeons: Scott Monzon MD            Relevant Problems   Anesthesia (within normal limits)      Cardiac   (+) Aneurysm of ascending aorta without rupture   (+) Coronary artery disease involving native coronary artery of native heart without angina pectoris   (+) Hyperlipidemia   (+) Hypertension, essential       Clinical information reviewed:    Allergies  Meds               NPO Detail:  NPO/Void Status  Carbohydrate Drink Given Prior to Surgery? : N  Date of Last Liquid: 07/25/25  Time of Last Liquid: 0500  Date of Last Solid: 07/24/25  Time of Last Solid: 1730  Last Intake Type: Solid meal  Time of Last Void: 0637         Physical Exam    Airway  Mallampati: I  TM distance: >3 FB  Neck ROM: full  Mouth opening: 3 or more finger widths     Cardiovascular - normal exam   Dental - normal exam     Pulmonary - normal exam   Abdominal - normal exam           Anesthesia Plan    History of general anesthesia?: yes  History of complications of general anesthesia?: no    ASA 2     general     The patient is not a current smoker.  Patient was previously instructed to abstain from smoking on day of procedure.  Patient did not smoke on day of procedure.    intravenous induction   Postoperative pain plan includes opioids.  Trial extubation is planned.  Anesthetic plan and risks discussed with patient.  Use of blood products discussed with patient who consented to blood products.

## 2025-07-25 NOTE — H&P
History Of Present Illness  Norbert Avalos is a 70 y.o. male presenting with left sided kidney stones.     Past Medical History  Medical History[1]    Surgical History  Surgical History[2]     Social History  He reports that he has never smoked. He has never used smokeless tobacco. He reports current alcohol use of about 5.0 standard drinks of alcohol per week. He reports that he does not currently use drugs.    Family History  Family History[3]     Allergies  Patient has no known allergies.    Review of Systems  14-point ROS negative unless otherwise indicated in HPI     Physical Exam  GEN: NAD  HEENT: trachea midline, no scleral icterus  CV: RRR  Pulm: non labored breathing ORA  Abd: s/nt/nd  : voiding spontaneously, no CVA tenderness  Psych: appropriate mood and affect       Last Recorded Vitals  Weight 84.1 kg (185 lb 6.5 oz).    Relevant Results  No results found for this or any previous visit (from the past 24 hours).  No results found.       Assessment & Plan      Proceed with left mini PCNL        Ayaz Werner MD         [1]   Past Medical History:  Diagnosis Date    Agatston coronary artery calcium score between 200 and 399 12/13/2023    CAC= 349 8/24/23    Aneurysm of ascending aorta without rupture 12/13/2023    3.9 cm, echo 12/16/24, CTA chest 1/16/25    Coronary artery disease involving native coronary artery of native heart without angina pectoris 12/13/2023    asymptomatic, follows with Dr. Fonseca    ED (erectile dysfunction)     History of echocardiogram 12/16/2024    History of stress test 12/13/2023    HLD (hyperlipidemia)     HTN (hypertension)     Left ureteral calculus     Renal cyst    [2]   Past Surgical History:  Procedure Laterality Date    BREAST FIBROADENOMA SURGERY Bilateral     age 16    COLONOSCOPY  2020    OTHER SURGICAL HISTORY  06/02/2022    WISDOM TOOTH EXTRACTION     [3]   Family History  Problem Relation Name Age of Onset    COPD Mother      Other (hld) Father      Breast  cancer Sister          in remission    No Known Problems Sister          x4    Other (hld) Brother      No Known Problems Brother          x7

## 2025-07-26 VITALS
RESPIRATION RATE: 18 BRPM | SYSTOLIC BLOOD PRESSURE: 123 MMHG | DIASTOLIC BLOOD PRESSURE: 71 MMHG | HEART RATE: 85 BPM | HEIGHT: 70 IN | WEIGHT: 185.41 LBS | BODY MASS INDEX: 26.54 KG/M2 | OXYGEN SATURATION: 90 % | TEMPERATURE: 98.6 F

## 2025-07-26 PROBLEM — N20.1 LEFT URETERAL CALCULUS: Status: RESOLVED | Noted: 2025-03-27 | Resolved: 2025-07-26

## 2025-07-26 LAB
ANION GAP SERPL CALC-SCNC: 12 MMOL/L (ref 10–20)
BUN SERPL-MCNC: 13 MG/DL (ref 6–23)
CALCIUM SERPL-MCNC: 8.4 MG/DL (ref 8.6–10.3)
CHLORIDE SERPL-SCNC: 103 MMOL/L (ref 98–107)
CO2 SERPL-SCNC: 25 MMOL/L (ref 21–32)
CREAT SERPL-MCNC: 1.4 MG/DL (ref 0.5–1.3)
EGFRCR SERPLBLD CKD-EPI 2021: 54 ML/MIN/1.73M*2
ERYTHROCYTE [DISTWIDTH] IN BLOOD BY AUTOMATED COUNT: 13.2 % (ref 11.5–14.5)
GLUCOSE SERPL-MCNC: 94 MG/DL (ref 74–99)
HCT VFR BLD AUTO: 34.8 % (ref 41–52)
HGB BLD-MCNC: 11.3 G/DL (ref 13.5–17.5)
MAGNESIUM SERPL-MCNC: 1.52 MG/DL (ref 1.6–2.4)
MCH RBC QN AUTO: 28.8 PG (ref 26–34)
MCHC RBC AUTO-ENTMCNC: 32.5 G/DL (ref 32–36)
MCV RBC AUTO: 89 FL (ref 80–100)
NRBC BLD-RTO: 0 /100 WBCS (ref 0–0)
PLATELET # BLD AUTO: 226 X10*3/UL (ref 150–450)
POTASSIUM SERPL-SCNC: 2.9 MMOL/L (ref 3.5–5.3)
POTASSIUM SERPL-SCNC: 3.4 MMOL/L (ref 3.5–5.3)
RBC # BLD AUTO: 3.92 X10*6/UL (ref 4.5–5.9)
SODIUM SERPL-SCNC: 137 MMOL/L (ref 136–145)
WBC # BLD AUTO: 10.4 X10*3/UL (ref 4.4–11.3)

## 2025-07-26 PROCEDURE — 84132 ASSAY OF SERUM POTASSIUM: CPT | Mod: 59

## 2025-07-26 PROCEDURE — 83735 ASSAY OF MAGNESIUM: CPT

## 2025-07-26 PROCEDURE — 2500000002 HC RX 250 W HCPCS SELF ADMINISTERED DRUGS (ALT 637 FOR MEDICARE OP, ALT 636 FOR OP/ED)

## 2025-07-26 PROCEDURE — 36415 COLL VENOUS BLD VENIPUNCTURE: CPT

## 2025-07-26 PROCEDURE — 7100000011 HC EXTENDED STAY RECOVERY HOURLY - NURSING UNIT

## 2025-07-26 PROCEDURE — 2500000002 HC RX 250 W HCPCS SELF ADMINISTERED DRUGS (ALT 637 FOR MEDICARE OP, ALT 636 FOR OP/ED): Performed by: STUDENT IN AN ORGANIZED HEALTH CARE EDUCATION/TRAINING PROGRAM

## 2025-07-26 PROCEDURE — 2500000001 HC RX 250 WO HCPCS SELF ADMINISTERED DRUGS (ALT 637 FOR MEDICARE OP)

## 2025-07-26 PROCEDURE — 2500000004 HC RX 250 GENERAL PHARMACY W/ HCPCS (ALT 636 FOR OP/ED)

## 2025-07-26 PROCEDURE — 2500000001 HC RX 250 WO HCPCS SELF ADMINISTERED DRUGS (ALT 637 FOR MEDICARE OP): Performed by: STUDENT IN AN ORGANIZED HEALTH CARE EDUCATION/TRAINING PROGRAM

## 2025-07-26 PROCEDURE — 96372 THER/PROPH/DIAG INJ SC/IM: CPT | Performed by: STUDENT IN AN ORGANIZED HEALTH CARE EDUCATION/TRAINING PROGRAM

## 2025-07-26 PROCEDURE — 80048 BASIC METABOLIC PNL TOTAL CA: CPT | Performed by: STUDENT IN AN ORGANIZED HEALTH CARE EDUCATION/TRAINING PROGRAM

## 2025-07-26 PROCEDURE — 85027 COMPLETE CBC AUTOMATED: CPT | Performed by: STUDENT IN AN ORGANIZED HEALTH CARE EDUCATION/TRAINING PROGRAM

## 2025-07-26 PROCEDURE — 36415 COLL VENOUS BLD VENIPUNCTURE: CPT | Performed by: STUDENT IN AN ORGANIZED HEALTH CARE EDUCATION/TRAINING PROGRAM

## 2025-07-26 PROCEDURE — 99238 HOSP IP/OBS DSCHRG MGMT 30/<: CPT | Performed by: NURSE PRACTITIONER

## 2025-07-26 PROCEDURE — 2500000004 HC RX 250 GENERAL PHARMACY W/ HCPCS (ALT 636 FOR OP/ED): Performed by: STUDENT IN AN ORGANIZED HEALTH CARE EDUCATION/TRAINING PROGRAM

## 2025-07-26 RX ORDER — MAGNESIUM SULFATE 1 G/100ML
1 INJECTION INTRAVENOUS ONCE
Status: COMPLETED | OUTPATIENT
Start: 2025-07-26 | End: 2025-07-26

## 2025-07-26 RX ORDER — LANOLIN ALCOHOL/MO/W.PET/CERES
800 CREAM (GRAM) TOPICAL ONCE
Status: COMPLETED | OUTPATIENT
Start: 2025-07-26 | End: 2025-07-26

## 2025-07-26 RX ORDER — POTASSIUM CHLORIDE 14.9 MG/ML
20 INJECTION INTRAVENOUS ONCE
Status: COMPLETED | OUTPATIENT
Start: 2025-07-26 | End: 2025-07-26

## 2025-07-26 RX ORDER — POTASSIUM CHLORIDE 20 MEQ/1
20 TABLET, EXTENDED RELEASE ORAL ONCE
Status: COMPLETED | OUTPATIENT
Start: 2025-07-26 | End: 2025-07-26

## 2025-07-26 RX ORDER — POTASSIUM CHLORIDE 20 MEQ/1
40 TABLET, EXTENDED RELEASE ORAL ONCE
Status: COMPLETED | OUTPATIENT
Start: 2025-07-26 | End: 2025-07-26

## 2025-07-26 RX ADMIN — PHENAZOPYRIDINE 100 MG: 100 TABLET ORAL at 08:26

## 2025-07-26 RX ADMIN — ASPIRIN 81 MG: 81 TABLET, COATED ORAL at 08:26

## 2025-07-26 RX ADMIN — ACETAMINOPHEN 975 MG: 325 TABLET ORAL at 10:01

## 2025-07-26 RX ADMIN — KETOROLAC TROMETHAMINE 15 MG: 30 INJECTION, SOLUTION INTRAMUSCULAR at 08:27

## 2025-07-26 RX ADMIN — POTASSIUM CHLORIDE 20 MEQ: 14.9 INJECTION, SOLUTION INTRAVENOUS at 09:45

## 2025-07-26 RX ADMIN — Medication 5 MG: at 00:16

## 2025-07-26 RX ADMIN — HYDROCHLOROTHIAZIDE 25 MG: 25 TABLET ORAL at 08:26

## 2025-07-26 RX ADMIN — MAGNESIUM SULFATE HEPTAHYDRATE 1 G: 1 INJECTION, SOLUTION INTRAVENOUS at 12:36

## 2025-07-26 RX ADMIN — MAGNESIUM OXIDE TAB 400 MG (241.3 MG ELEMENTAL MG) 2 TABLET: 400 (241.3 MG) TAB at 12:35

## 2025-07-26 RX ADMIN — KETOROLAC TROMETHAMINE 15 MG: 30 INJECTION, SOLUTION INTRAMUSCULAR at 00:16

## 2025-07-26 RX ADMIN — AMLODIPINE BESYLATE 10 MG: 10 TABLET ORAL at 08:27

## 2025-07-26 RX ADMIN — CEFAZOLIN SODIUM 2 G: 2 SOLUTION INTRAVENOUS at 08:26

## 2025-07-26 RX ADMIN — ATORVASTATIN CALCIUM 80 MG: 80 TABLET, FILM COATED ORAL at 08:27

## 2025-07-26 RX ADMIN — HEPARIN SODIUM 5000 UNITS: 5000 INJECTION, SOLUTION INTRAVENOUS; SUBCUTANEOUS at 13:33

## 2025-07-26 RX ADMIN — POLYETHYLENE GLYCOL 3350 17 G: 17 POWDER, FOR SOLUTION ORAL at 08:26

## 2025-07-26 RX ADMIN — POTASSIUM CHLORIDE 40 MEQ: 1500 TABLET, EXTENDED RELEASE ORAL at 09:47

## 2025-07-26 RX ADMIN — PHENAZOPYRIDINE 100 MG: 100 TABLET ORAL at 12:36

## 2025-07-26 RX ADMIN — POTASSIUM CHLORIDE 20 MEQ: 1500 TABLET, EXTENDED RELEASE ORAL at 15:10

## 2025-07-26 RX ADMIN — TAMSULOSIN HYDROCHLORIDE 0.4 MG: 0.4 CAPSULE ORAL at 09:47

## 2025-07-26 RX ADMIN — HEPARIN SODIUM 5000 UNITS: 5000 INJECTION, SOLUTION INTRAVENOUS; SUBCUTANEOUS at 04:17

## 2025-07-26 RX ADMIN — ACETAMINOPHEN 975 MG: 325 TABLET ORAL at 04:17

## 2025-07-26 RX ADMIN — CEFAZOLIN SODIUM 2 G: 2 SOLUTION INTRAVENOUS at 00:16

## 2025-07-26 ASSESSMENT — PAIN SCALES - GENERAL: PAINLEVEL_OUTOF10: 0 - NO PAIN

## 2025-07-26 ASSESSMENT — PAIN - FUNCTIONAL ASSESSMENT: PAIN_FUNCTIONAL_ASSESSMENT: 0-10

## 2025-07-26 NOTE — CARE PLAN
The patient's goals for the shift include      The clinical goals for the shift include   Problem: Pain - Adult  Goal: Verbalizes/displays adequate comfort level or baseline comfort level  Outcome: Progressing     Problem: Safety - Adult  Goal: Free from fall injury  Outcome: Progressing     Problem: Discharge Planning  Goal: Discharge to home or other facility with appropriate resources  Outcome: Progressing     Problem: Chronic Conditions and Co-morbidities  Goal: Patient's chronic conditions and co-morbidity symptoms are monitored and maintained or improved  Outcome: Progressing     Problem: Nutrition  Goal: Nutrient intake appropriate for maintaining nutritional needs  Outcome: Progressing     Problem: Fall/Injury  Goal: Not fall by end of shift  Outcome: Progressing  Goal: Be free from injury by end of the shift  Outcome: Progressing  Goal: Verbalize understanding of personal risk factors for fall in the hospital  Outcome: Progressing  Goal: Verbalize understanding of risk factor reduction measures to prevent injury from fall in the home  Outcome: Progressing  Goal: Use assistive devices by end of the shift  Outcome: Progressing  Goal: Pace activities to prevent fatigue by end of the shift  Outcome: Progressing     Problem: Skin  Goal: Decreased wound size/increased tissue granulation at next dressing change  Outcome: Progressing  Flowsheets (Taken 7/26/2025 1122)  Decreased wound size/increased tissue granulation at next dressing change: Promote sleep for wound healing  Goal: Participates in plan/prevention/treatment measures  Outcome: Progressing  Flowsheets (Taken 7/26/2025 1122)  Participates in plan/prevention/treatment measures: Increase activity/out of bed for meals  Goal: Prevent/manage excess moisture  Outcome: Progressing  Flowsheets (Taken 7/26/2025 1122)  Prevent/manage excess moisture: Moisturize dry skin  Goal: Prevent/minimize sheer/friction injuries  Outcome: Progressing  Flowsheets (Taken  7/26/2025 1122)  Prevent/minimize sheer/friction injuries: Use pull sheet  Goal: Promote/optimize nutrition  Outcome: Progressing  Flowsheets (Taken 7/26/2025 1122)  Promote/optimize nutrition: Consume > 50% meals/supplements  Goal: Promote skin healing  Outcome: Progressing  Flowsheets (Taken 7/26/2025 1122)  Promote skin healing: Assess skin/pad under line(s)/device(s)      No respiratory distress. No stridor, Lungs sounds clear with good aeration bilaterally.

## 2025-07-26 NOTE — TREATMENT PLAN
Patient doing well. Tolerating diet, pain well managed. Urine is clear and without blood. Tomas removed, passed TOV.    Labs reveal significant hypokalemia and mild hypomagnesemia, which has be repleted with PO and IV. Potassium will be rechecked prior to discharge. Mild bump in Cre d/t kidney manipulation is expected and not concerning.     Patient will be discharged with medications ordered by primary team form meds-to-beds yesterday, no additional medications at discharge. He should follow-up with his PCP regarding hypokalemia for recheck and possible discontinuation of hydrochlorothiazide. He should follow-up with Dr. Monzon in 4 weeks.     Patient seen and discussed with attending surgeon, Dr. Monzon.     Aisha Nam PA-C

## 2025-07-26 NOTE — CARE PLAN
Problem: Pain - Adult  Goal: Verbalizes/displays adequate comfort level or baseline comfort level  Outcome: Progressing     Problem: Safety - Adult  Goal: Free from fall injury  Outcome: Progressing     Problem: Discharge Planning  Goal: Discharge to home or other facility with appropriate resources  Outcome: Progressing     Problem: Chronic Conditions and Co-morbidities  Goal: Patient's chronic conditions and co-morbidity symptoms are monitored and maintained or improved  Outcome: Progressing     Problem: Nutrition  Goal: Nutrient intake appropriate for maintaining nutritional needs  Outcome: Progressing     Problem: Fall/Injury  Goal: Not fall by end of shift  Outcome: Progressing  Goal: Be free from injury by end of the shift  Outcome: Progressing  Goal: Verbalize understanding of personal risk factors for fall in the hospital  Outcome: Progressing  Goal: Verbalize understanding of risk factor reduction measures to prevent injury from fall in the home  Outcome: Progressing  Goal: Use assistive devices by end of the shift  Outcome: Progressing  Goal: Pace activities to prevent fatigue by end of the shift  Outcome: Progressing     Problem: Pain  Goal: Takes deep breaths with improved pain control throughout the shift  Outcome: Progressing  Goal: Turns in bed with improved pain control throughout the shift  Outcome: Progressing  Goal: Walks with improved pain control throughout the shift  Outcome: Progressing  Goal: Performs ADL's with improved pain control throughout shift  Outcome: Progressing  Goal: Participates in PT with improved pain control throughout the shift  Outcome: Progressing  Goal: Free from opioid side effects throughout the shift  Outcome: Progressing  Goal: Free from acute confusion related to pain meds throughout the shift  Outcome: Progressing     Problem: Skin  Goal: Decreased wound size/increased tissue granulation at next dressing change  Outcome: Progressing  Goal: Participates in  plan/prevention/treatment measures  Outcome: Progressing  Goal: Prevent/manage excess moisture  Outcome: Progressing  Goal: Prevent/minimize sheer/friction injuries  Outcome: Progressing  Goal: Promote/optimize nutrition  Outcome: Progressing  Goal: Promote skin healing  Outcome: Progressing

## 2025-07-28 NOTE — DISCHARGE SUMMARY
Discharge Diagnosis  Left ureteral calculus    Issues Requiring Follow-Up  POV    Test Results Pending At Discharge  Pending Labs       Order Current Status    Calculi (Stone) Analysis In process            Hospital Course   71 yo male with severely impacted left proximal ureteral stone s/p mini PCNL with ureteral stent with Dr. Monzon and Dr. Blackman on 7/25.     The procedure was well tolerated and has had an uncomplicated surgical course. Today, scott was removed, and pt passed a TOV. Perioperative antibiotics were continued until discharge. At the time of hospital discharge he was tolerating a regular diet, ambulating independently, passing flatus, and pain was well-controlled. He has a supportive home environment and independent with ADLs. Patient was not found to have impaired mobility thus no PT eval or home services were deemed to be required. He was discharged in satisfactory condition and will follow up with outpatient urology.      Visit Vitals  /71 (BP Location: Left arm, Patient Position: Lying)   Pulse 85   Temp 37 °C (98.6 °F) (Temporal)   Resp 18     Vitals:    07/25/25 1345   Weight: 84.1 kg (185 lb 6.5 oz)       Immunization History   Administered Date(s) Administered    Hepatitis B vaccine, adult *Check Product/Dose* 08/21/2002    Pfizer Purple Cap SARS-CoV-2 06/30/2021, 07/21/2021, 01/23/2022    Pneumococcal conjugate vaccine, 20-valent (PREVNAR 20) 01/10/2024    Tdap vaccine, age 7 year and older (BOOSTRIX, ADACEL) 06/08/2012       Results      Pertinent Physical Exam At Time of Discharge  Physical Exam  Vitals reviewed.         Home Medications     Medication List      START taking these medications     * acetaminophen 500 mg tablet; Commonly known as: Tylenol; Take 2   tablets (1,000 mg) by mouth every 6 hours if needed for mild pain (1 - 3).   ciprofloxacin 500 mg tablet; Commonly known as: Cipro; Take 1 tablet   (500 mg) by mouth 2 times a day for 5 days.   ketorolac 10 mg tablet;  Commonly known as: Toradol; Take 1 tablet (10   mg) by mouth every 6 hours if needed for moderate pain (4 - 6) for up to 3   days.   phenazopyridine 200 mg tablet; Commonly known as: Pyridium; Take 1   tablet (200 mg) by mouth 3 times a day for 5 days.   tamsulosin 0.4 mg 24 hr capsule; Commonly known as: Flomax; Take 1   capsule (0.4 mg) by mouth once daily. Do not crush, chew, or split.  * This list has 1 medication(s) that are the same as other medications   prescribed for you. Read the directions carefully, and ask your doctor or   other care provider to review them with you.     CONTINUE taking these medications     amLODIPine 10 mg tablet; Commonly known as: Norvasc; Take 1 tablet (10   mg) by mouth once daily.   aspirin 81 mg EC tablet   atorvastatin 80 mg tablet; Commonly known as: Lipitor; Take 1 tablet (80   mg) by mouth once daily.   GARLIC ORAL   hydroCHLOROthiazide 25 mg tablet; Commonly known as: HYDRODiuril; Take 1   tablet (25 mg) by mouth once daily.   losartan 100 mg tablet; Commonly known as: Cozaar; Take 1 tablet (100   mg) by mouth once daily.   omega-3 acid ethyl esters 1 gram capsule; Commonly known as: Lovaza   Praluent Pen 150 mg/mL pen injector; Generic drug: alirocumab; Inject 1   mL (150 mg) under the skin every 14 (fourteen) days.   TURMERIC ORAL     STOP taking these medications     ibuprofen 200 mg tablet     ASK your doctor about these medications     * acetaminophen 650 mg ER tablet; Commonly known as: Tylenol 8 HOUR   sildenafil 100 mg tablet; Commonly known as: Viagra; Take 1 tablet (100   mg) by mouth once daily as needed for erectile dysfunction.  * This list has 1 medication(s) that are the same as other medications   prescribed for you. Read the directions carefully, and ask your doctor or   other care provider to review them with you.       Outpatient Follow-Up  Future Appointments   Date Time Provider Department Center   8/19/2025 10:30 AM Scott Monzon MD [id number]    10/6/2025 10:40 AM Casandra Will, PharmD [id number]   12/17/2025  8:40 AM Crow Fonseca MD [address]       LEIGHANN Amaya-CNP

## 2025-07-30 LAB
APPEARANCE STONE: NORMAL
COMPN STONE: NORMAL
SPECIMEN WT: 78 MG

## 2025-08-15 PROCEDURE — RXMED WILLOW AMBULATORY MEDICATION CHARGE

## 2025-08-18 ENCOUNTER — PHARMACY VISIT (OUTPATIENT)
Dept: PHARMACY | Facility: CLINIC | Age: 70
End: 2025-08-18
Payer: COMMERCIAL

## 2025-08-19 ENCOUNTER — APPOINTMENT (OUTPATIENT)
Dept: UROLOGY | Facility: CLINIC | Age: 70
End: 2025-08-19
Payer: COMMERCIAL

## 2025-08-19 VITALS — WEIGHT: 187 LBS | TEMPERATURE: 97.4 F | BODY MASS INDEX: 26.77 KG/M2 | HEIGHT: 70 IN

## 2025-08-19 DIAGNOSIS — N20.1 LEFT URETERAL CALCULUS: Primary | ICD-10-CM

## 2025-08-19 LAB
POC APPEARANCE, URINE: CLEAR
POC BILIRUBIN, URINE: NEGATIVE
POC BLOOD, URINE: ABNORMAL
POC COLOR, URINE: YELLOW
POC GLUCOSE, URINE: NEGATIVE MG/DL
POC KETONES, URINE: NEGATIVE MG/DL
POC LEUKOCYTES, URINE: ABNORMAL
POC NITRITE,URINE: NEGATIVE
POC PH, URINE: 7.5 PH
POC PROTEIN, URINE: NEGATIVE MG/DL
POC SPECIFIC GRAVITY, URINE: 1.01
POC UROBILINOGEN, URINE: 0.2 EU/DL

## 2025-08-19 PROCEDURE — 3008F BODY MASS INDEX DOCD: CPT

## 2025-08-19 PROCEDURE — G2211 COMPLEX E/M VISIT ADD ON: HCPCS

## 2025-08-19 PROCEDURE — 1126F AMNT PAIN NOTED NONE PRSNT: CPT

## 2025-08-19 PROCEDURE — 99215 OFFICE O/P EST HI 40 MIN: CPT

## 2025-08-19 PROCEDURE — 81003 URINALYSIS AUTO W/O SCOPE: CPT

## 2025-08-19 PROCEDURE — 1159F MED LIST DOCD IN RCRD: CPT

## 2025-08-19 PROCEDURE — 1111F DSCHRG MED/CURRENT MED MERGE: CPT

## 2025-08-19 PROCEDURE — 1036F TOBACCO NON-USER: CPT

## 2025-08-19 ASSESSMENT — PAIN SCALES - GENERAL: PAINLEVEL_OUTOF10: 0-NO PAIN

## 2025-08-22 ENCOUNTER — HOSPITAL ENCOUNTER (OUTPATIENT)
Dept: RADIOLOGY | Facility: CLINIC | Age: 70
Discharge: HOME | End: 2025-08-22
Payer: COMMERCIAL

## 2025-08-22 DIAGNOSIS — N20.1 LEFT URETERAL CALCULUS: ICD-10-CM

## 2025-08-22 PROCEDURE — 74176 CT ABD & PELVIS W/O CONTRAST: CPT

## 2025-09-04 ENCOUNTER — APPOINTMENT (OUTPATIENT)
Dept: UROLOGY | Facility: CLINIC | Age: 70
End: 2025-09-04
Payer: COMMERCIAL

## 2025-09-04 PROBLEM — N20.1 LEFT URETERAL CALCULUS: Status: ACTIVE | Noted: 2025-09-04

## 2025-09-04 ASSESSMENT — PAIN SCALES - GENERAL: PAINLEVEL_OUTOF10: 0-NO PAIN

## 2025-10-06 ENCOUNTER — APPOINTMENT (OUTPATIENT)
Dept: PHARMACY | Facility: HOSPITAL | Age: 70
End: 2025-10-06
Payer: COMMERCIAL

## (undated) DEVICE — TOWEL, SURGICAL, NEURO, O/R, 16 X 26, BLUE, STERILE

## (undated) DEVICE — SCOPE, LITHOVUE SUD ONLY, GLOBAL STANDARD

## (undated) DEVICE — DRESSING, TRANSPARENT, TEGADERM, 4 X 4-3/4 IN

## (undated) DEVICE — DRAPE, TIBURON, LITHOTOMY, W/FLUID CONTROL POUCH

## (undated) DEVICE — TUBING, CLEAR N-COND, 5MM X 10, LF

## (undated) DEVICE — IRRIGATION SET, CYSTOSCOPY, TURP, Y, CONTINUOUS, 81 IN

## (undated) DEVICE — GUIDEWIRE, DUAL SENSOR, .035 X 150 STRAIGHT,  3CM

## (undated) DEVICE — Device

## (undated) DEVICE — DILATOR SET, RENAL, AMPLATZ, W/CATHETER, 8 FR, 84 CM, 6FR-30FR

## (undated) DEVICE — LASER FIBER, HOLMIUM 200 (5/BOX)

## (undated) DEVICE — SYRINGE, 10 CC, LUER LOCK

## (undated) DEVICE — GUIDEWIRE, ZIPWIRE, STANDARD, 0.035 X 150CM, ANGLED TIP

## (undated) DEVICE — SLEEVE, VASO PRESS, CALF GARMENT, MEDIUM, GREEN

## (undated) DEVICE — STENT, URETERAL, INLAY, 6FR X 26CM, W/O GUIDEWIRE

## (undated) DEVICE — STONE COLLECTION BOTTLE

## (undated) DEVICE — BAG, BANDED, 36IN X 28IN

## (undated) DEVICE — BAG, PRESSURE INFUSER, 3000 CC, LF

## (undated) DEVICE — SET, EXTENSION, IV, 3.9ML, 34 IN, MALE, LUER LOCK, LF

## (undated) DEVICE — GEL, ECOVUE, ULTRASOUND, 20GRAM, STERILE

## (undated) DEVICE — CATHETER, URETERAL ACCESS FLEXI-TIP 10FR

## (undated) DEVICE — VALVE, ENDOSCOPIC, SURESEAL II, SIZE 0-5FR

## (undated) DEVICE — SYRINGE, 50 CC, LUER LOCK

## (undated) DEVICE — CATHETER, UROLOGY, SILICONE, 16FR, COUNCIL TIP, LF

## (undated) DEVICE — GUIDEWIRE, STRAIGHT, ZIPWIRE, .035 X 150CM, STANDARD

## (undated) DEVICE — TUBING, SUCTION, CONNECTING, STERILE 0.25 X 120 IN., LF

## (undated) DEVICE — SOLUTION, INJECTION, CONTRAST, OMNIPAQUE 240MG 50ML, PLUS PAK

## (undated) DEVICE — COLLECTION BAG, FLUID, NON-STERILE